# Patient Record
Sex: FEMALE | Race: WHITE
[De-identification: names, ages, dates, MRNs, and addresses within clinical notes are randomized per-mention and may not be internally consistent; named-entity substitution may affect disease eponyms.]

---

## 2017-08-01 NOTE — RADRPT
EXAM DATE/TIME:  08/01/2017 21:28 

 

[facility] COMPARISON:     

CT BRAIN W/O CONTRAST, November 22, 2015, 18:25.

 

 

INDICATIONS :     

Headaches with alter mental status.

                      

 

RADIATION DOSE:     

56.35 CTDIvol (mGy) 

 

 

 

MEDICAL HISTORY :     

Hypertension. Aneurysm, intracranial. Epilepsy

 

SURGICAL HISTORY :      

Craniotomy. 

 

ENCOUNTER:      

Initial

 

ACUITY:      

1 day

 

PAIN SCALE:      

5/10

 

LOCATION:        

cranial 

 

TECHNIQUE:     

Multiple contiguous axial images were obtained of the head.  Using automated exposure control and adj
ustment of the mA and/or kV according to patient size, radiation dose was kept as low as reasonably a
chievable to obtain optimal diagnostic quality images.   DICOM format image data is available electro
nically for review and comparison.  

 

FINDINGS:     

 

CEREBRUM:     

The ventricles are normal for age.  No evidence of midline shift, mass lesion, hemorrhage or acute in
farction.  Stable hypodensity in the left mid convexity frontal region adjacent to the anterior crani
otomy defect. No extra-axial fluid collections are seen.  The metallic artifact from aneurysm clip in
 the left supraclinoid region.

 

POSTERIOR FOSSA:     

The cerebellum and brainstem are intact.  The 4th ventricle is midline.  The cerebellopontine angle i
s unremarkable.

 

EXTRACRANIAL:     

The visualized portion of the orbits is intact.

 

SKULL:     

Good approximation of the left craniotomy flap, stable in configuration from prior.

 

CONCLUSION:     

No acute findings.

 

 

 

 Moshe Benjamin MD on August 01, 2017 at 22:02           

Board Certified Radiologist.

 This report was verified electronically.

## 2017-08-01 NOTE — PD
HPI


Chief Complaint:  Fall


Time Seen by Provider:  20:39


Travel History


International Travel<30 days:  No


Contact w/Intl Traveler<30days:  No





History of Present Illness


HPI


Patient is a 66-year-old female presenting to the emergency department for 

evaluation after being found on the floor by her family.  Patient is reporting 

a headache and pain in the back of her head.  She denies any other complaints 

however she reportedly has the mentation of a 14-year-old per EMS.  Patient 

does not remember if she fell or how she got on the floor.  She denies any neck 

pain, back pain, abdominal pain, chest pain.  She states that "she doesn't feel 

good".





PFSH


Past Medical History


Bipolar Disorder:  Yes


Cancer:  No


Diabetes:  Yes


Diminished Hearing:  No


Gastrointestinal Disorders:  No


Genitourinary:  No


Headaches:  No


Hypertension:  Yes


Musculoskeletal:  No


Neurologic:  Yes


Psychiatric:  Yes (BiPolar Disorder)


Reproductive:  No


Respiratory:  No


Immunizations Current:  Yes (unknown)


Migraines:  Yes


Seizures:  Yes





Past Surgical History


Tonsillectomy:  Yes


Other Surgery:  Yes (SPINAL SURGERY 12 YEARS AGO)





Social History


Alcohol Use:  No


Tobacco Use:  No


Substance Use:  No





Allergies-Medications


(Allergen,Severity, Reaction):  


Coded Allergies:  


     Aspirin (Verified  Allergy, Unknown, 5/16/16)


     Nonsteroidal Anti-Inflammatory Agts (Verified  Allergy, Unknown, Rash, 5/16 /16)


     MRI PRECAUTION (Verified  Adverse Reaction, Severe, ANEURYSM CLIPS IN BRAIN

, 5/16/16)


 11/22/15 JLA


Reported Meds & Prescriptions





Reported Meds & Active Scripts


Active


Prinivil 20 mg (Lisinopril) 20 Mg Tab 20 Mg PO Q12HR 


Novolog Insulin Supplemental Scale (Insulin Aspart) 100 /Ml  Inj 1 Units SQ 

ACHS SLIDING SCALE  30 Days


Levemir (Insulin Detemir) 100 Units/Ml Inj 10 Units SQ HS  30 Days


Hydrocodone/Acetaminophen 5 mg/325 mg 1 Tab Tab 1 Tab PO Q6H PRN


 Lipitor 20 Mg Tab (Atorvastatin) 20 Mg Tab 20 Mg PO HS 


Reported


Quetiapine Fumarate 25 Mg Tab 50 Mg PO HS 


Catapres 0.1 mg (Clonidine HCl) 0.1 Mg Tab 1 Tab PO BID 


Keppra (Levetiracetam) 250 Mg Tab 500 Mg PO BID 


Lithium Carbonate Er (Lithium Carbonate) 300 Mg Tab 300 Mg PO DAILY 








Review of Systems


ROS Limitations:  Poor Historian


Except as stated in HPI:  all other systems reviewed are Neg


HENT:  Positive: Headaches





Physical Exam


Narrative


GENERAL: Well-developed, well-nourished, alert  female.  Resting 

comfortably in no acute distress.


SKIN: Warm and dry.


HEAD: Atraumatic. Normocephalic.  No obvious lesions or abrasions, there is 

tenderness to palpation over the occipital area


EYES: Pupils equal and round. No scleral icterus. No injection or drainage. 


ENT: No nasal bleeding or discharge.  Mucous membranes pink and moist.


NECK: Trachea midline. No JVD. 


CARDIOVASCULAR: Regular rate and rhythm.  


RESPIRATORY: No accessory muscle use. Clear to auscultation. Breath sounds 

equal bilaterally. 


GASTROINTESTINAL: Abdomen soft, non-tender, nondistended. Hepatic and splenic 

margins not palpable. 


MUSCULOSKELETAL: Extremities without clubbing, cyanosis, or edema. No obvious 

deformities. 


NEUROLOGICAL: Awake and alert. No obvious cranial nerve deficits.  Motor 

grossly within normal limits. Five out of 5 muscle strength in the arms and 

legs.  Normal speech.


PSYCHIATRIC: Appropriate mood and affect; insight and judgment impaired.





Data


Data


Last Documented VS





Vital Signs








  Date Time  Temp Pulse Resp B/P Pulse Ox O2 Delivery O2 Flow Rate FiO2


 


8/1/17 21:02  65 20 164/80 98 Room Air  


 


8/1/17 20:31 98.0       








Orders





 Electrocardiogram (8/1/17 20:36)


Complete Blood Count With Diff (8/1/17 20:36)


Comprehensive Metabolic Panel (8/1/17 20:36)


Prothrombin Time / Inr (Pt) (8/1/17 20:36)


Act Partial Throm Time (Ptt) (8/1/17 20:36)


Urinalysis - C+S If Indicated (8/1/17 20:36)


Ct Brain W/O Iv Contrast(Rout) (8/1/17 20:36)


Blood Glucose (8/1/17 20:36)


Ecg Monitoring (8/1/17 20:36)


Iv Access Insert/Monitor (8/1/17 20:36)


Cath For Specimen (8/1/17 20:36)


Oximetry (8/1/17 20:36)


Sodium Chloride 0.9% Flush (Ns Flush) (8/1/17 20:45)


Acetaminophen (Tylenol) (8/1/17 22:15)


Sodium Chlor 0.9% 1000 Ml Inj (Ns 1000 M (8/1/17 22:30)





Labs





 Laboratory Tests








Test 8/1/17 8/1/17





 20:45 20:50


 


White Blood Count 9.2 TH/MM3 


 


Red Blood Count 3.86 MIL/MM3 


 


Hemoglobin 11.9 GM/DL 


 


Hematocrit 34.4 % 


 


Mean Corpuscular Volume 89.3 FL 


 


Mean Corpuscular Hemoglobin 30.9 PG 


 


Mean Corpuscular Hemoglobin 34.6 % 





Concent  


 


Red Cell Distribution Width 13.1 % 


 


Platelet Count 278 TH/MM3 


 


Mean Platelet Volume 9.3 FL 


 


Neutrophils (%) (Auto) 43.3 % 


 


Lymphocytes (%) (Auto) 45.2 % 


 


Monocytes (%) (Auto) 6.1 % 


 


Eosinophils (%) (Auto) 4.0 % 


 


Basophils (%) (Auto) 1.4 % 


 


Neutrophils # (Auto) 4.0 TH/MM3 


 


Lymphocytes # (Auto) 4.2 TH/MM3 


 


Monocytes # (Auto) 0.6 TH/MM3 


 


Eosinophils # (Auto) 0.4 TH/MM3 


 


Basophils # (Auto) 0.1 TH/MM3 


 


CBC Comment DIFF FINAL  


 


Differential Comment   


 


Prothrombin Time 10.3 SEC 


 


Prothromb Time International 0.9 RATIO 





Ratio  


 


Activated Partial 27.5 SEC 





Thromboplast Time  


 


Sodium Level 138 MEQ/L 


 


Potassium Level 4.0 MEQ/L 


 


Chloride Level 104 MEQ/L 


 


Carbon Dioxide Level 24.2 MEQ/L 


 


Anion Gap 10 MEQ/L 


 


Blood Urea Nitrogen 29 MG/DL 


 


Creatinine 1.16 MG/DL 


 


Estimat Glomerular Filtration 47 ML/MIN 





Rate  


 


Random Glucose 117 MG/DL 


 


Calcium Level 9.3 MG/DL 


 


Total Bilirubin 0.5 MG/DL 


 


Aspartate Amino Transf 23 U/L 





(AST/SGOT)  


 


Alanine Aminotransferase 18 U/L 





(ALT/SGPT)  


 


Alkaline Phosphatase 106 U/L 


 


Total Protein 8.8 GM/DL 


 


Albumin 4.0 GM/DL 


 


Urine Color  YELLOW 


 


Urine Turbidity  CLEAR 


 


Urine pH  5.5 


 


Urine Specific Gravity  1.020 


 


Urine Protein  TRACE mg/dL


 


Urine Glucose (UA)  NEG mg/dL


 


Urine Ketones  10 mg/dL


 


Urine Occult Blood  NEG 


 


Urine Nitrite  NEG 


 


Urine Bilirubin  NEG 


 


Urine Urobilinogen  LESS THAN 2.0





  MG/DL


 


Urine Leukocyte Esterase  NEG 


 


Urine RBC  2 /hpf


 


Urine WBC  2 /hpf


 


Microscopic Urinalysis Comment  CATH-CULT NOT





  IND











MDM


Medical Decision Making


Medical Screen Exam Complete:  Yes


Emergency Medical Condition:  Yes


Medical Record Reviewed:  Yes


Interpretation(s)





Last Impressions








Head CT 8/1/17 2036 Signed





Impressions: 





 Service Date/Time:  Tuesday, August 1, 2017 21:28 - CONCLUSION:  No acute 





 findings.     Moshe Benjamin MD 








 Laboratory Tests








Test 8/1/17 8/1/17





 20:45 20:50


 


White Blood Count 9.2 TH/MM3 


 


Red Blood Count 3.86 MIL/MM3 


 


Hemoglobin 11.9 GM/DL 


 


Hematocrit 34.4 % 


 


Mean Corpuscular Volume 89.3 FL 


 


Mean Corpuscular Hemoglobin 30.9 PG 


 


Mean Corpuscular Hemoglobin 34.6 % 





Concent  


 


Red Cell Distribution Width 13.1 % 


 


Platelet Count 278 TH/MM3 


 


Mean Platelet Volume 9.3 FL 


 


Neutrophils (%) (Auto) 43.3 % 


 


Lymphocytes (%) (Auto) 45.2 % 


 


Monocytes (%) (Auto) 6.1 % 


 


Eosinophils (%) (Auto) 4.0 % 


 


Basophils (%) (Auto) 1.4 % 


 


Neutrophils # (Auto) 4.0 TH/MM3 


 


Lymphocytes # (Auto) 4.2 TH/MM3 


 


Monocytes # (Auto) 0.6 TH/MM3 


 


Eosinophils # (Auto) 0.4 TH/MM3 


 


Basophils # (Auto) 0.1 TH/MM3 


 


CBC Comment DIFF FINAL  


 


Differential Comment   


 


Prothrombin Time 10.3 SEC 


 


Prothromb Time International 0.9 RATIO 





Ratio  


 


Activated Partial 27.5 SEC 





Thromboplast Time  


 


Sodium Level 138 MEQ/L 


 


Potassium Level 4.0 MEQ/L 


 


Chloride Level 104 MEQ/L 


 


Carbon Dioxide Level 24.2 MEQ/L 


 


Anion Gap 10 MEQ/L 


 


Blood Urea Nitrogen 29 MG/DL 


 


Creatinine 1.16 MG/DL 


 


Estimat Glomerular Filtration 47 ML/MIN 





Rate  


 


Random Glucose 117 MG/DL 


 


Calcium Level 9.3 MG/DL 


 


Total Bilirubin 0.5 MG/DL 


 


Aspartate Amino Transf 23 U/L 





(AST/SGOT)  


 


Alanine Aminotransferase 18 U/L 





(ALT/SGPT)  


 


Alkaline Phosphatase 106 U/L 


 


Total Protein 8.8 GM/DL 


 


Albumin 4.0 GM/DL 


 


Urine Color  YELLOW 


 


Urine Turbidity  CLEAR 


 


Urine pH  5.5 


 


Urine Specific Gravity  1.020 


 


Urine Protein  TRACE mg/dL


 


Urine Glucose (UA)  NEG mg/dL


 


Urine Ketones  10 mg/dL


 


Urine Occult Blood  NEG 


 


Urine Nitrite  NEG 


 


Urine Bilirubin  NEG 


 


Urine Urobilinogen  LESS THAN 2.0





  MG/DL


 


Urine Leukocyte Esterase  NEG 


 


Urine RBC  2 /hpf


 


Urine WBC  2 /hpf


 


Microscopic Urinalysis Comment  CATH-CULT NOT





  IND








Vital Signs








  Date Time  Temp Pulse Resp B/P Pulse Ox O2 Delivery O2 Flow Rate FiO2


 


8/1/17 21:02  65 20 164/80 98 Room Air  


 


8/1/17 20:52     98 Room Air  


 


8/1/17 20:45  75 18 222/89 98 Room Air  


 


8/1/17 20:31 98.0 68 15 198/90 100   








Differential Diagnosis


UTI versus contusion versus hemorrhage versus metabolic abnormality versus other


Narrative Course


Patient is a 66-year-old female presenting to the emergency department for 

evaluation after being found on the floor likely unwitnessed fall.  She is 

complaining of a headache and head pain.  She is alert, she appears to be at 

her baseline mentation.  No meningeal signs. Labs and imaging ordered and 

pending.


Initial EKG shows sinus rhythm with a rate of 60


CT of the brain shows no acute abnormality


CMP- BUN and creatinine 29/1.16


Coags are unremarkable


Urinalysis is unremarkable


Acetaminophen ordered for headache, 1 L IV fluids ordered.


Pt will be discharged home. She is to follow up with PCP. Pt to return to the 

ED for any new or worsening symptoms. Discussed with RN.





Diagnosis





 Primary Impression:  


 Fall


 Qualified Code:  W19.XXXA - Fall, initial encounter


 Additional Impression:  


 Headache


 Qualified Code:  R51 - Nonintractable headache, unspecified chronicity pattern

, unspecified headache type


Referrals:  


Primary Care Physician


1 day


Patient Instructions:  Acute Headache (ED), Fall Prevention (ED), General 

Instructions





***Additional Instructions:


Increase fluid intake


Follow up with primary doctor


Return to the Emergency Department for any new or worsening symptoms


***Med/Other Pt SpecificInfo:  No Change to Meds


Disposition:  01 DISCHARGE HOME


Condition:  Stable








Meagan Saldaña Aug 1, 2017 20:52

## 2017-08-02 NOTE — EKG
Date Performed: 08/01/2017       Time Performed: 21:18:17

 

PTAGE:      66 years

 

EKG:      Sinus rhythm 

 

 NORMAL ECG

 

PREVIOUS TRACING       : 11/22/2015 18.02

 

DOCTOR:   Giorgio Garber  Interpretating Date/Time  08/02/2017 09:14:05

## 2017-08-12 NOTE — PD
HPI


Chief Complaint:  Syncope/Near-Syncope


Time Seen by Provider:  11:17


Travel History


International Travel<30 days:  No


Contact w/Intl Traveler<30days:  No


Traveled to known affect area:  No





History of Present Illness


HPI


The patient was seen and examined in the presence of the nurse.  This patient 

was taking her pet to get vaccinated and became dizzy and lightheaded.  She 

lost her balance and fell.  No syncope.  She denies losing consciousness or 

having had or neck injury or pain.  At this point she feels like some general 

malaise.  No chest pain.  Symptoms severity is moderate.  No alleviating 

factors.  Duration 10 minutes





PFSH


Past Medical History


Bipolar Disorder:  Yes


Cancer:  No


Cardiovascular Problems:  Yes


Diabetes:  Yes


Diminished Hearing:  No


Gastrointestinal Disorders:  No


Genitourinary:  No


Headaches:  Yes


Hypertension:  Yes


Musculoskeletal:  No


Neurologic:  Yes


Psychiatric:  Yes (BiPolar Disorder)


Reproductive:  No


Respiratory:  No


Immunizations Current:  Yes (unknown)


Migraines:  Yes


Seizures:  Yes





Past Surgical History


Tonsillectomy:  Yes


Other Surgery:  Yes (SPINAL SURGERY 12 YEARS AGO)





Social History


Alcohol Use:  No


Tobacco Use:  No


Substance Use:  No





Allergies-Medications


(Allergen,Severity, Reaction):  


Coded Allergies:  


     Aspirin (Verified  Allergy, Unknown, 5/16/16)


     Nonsteroidal Anti-Inflammatory Agts (Verified  Allergy, Unknown, Rash, 5/16 /16)


     MRI PRECAUTION (Verified  Adverse Reaction, Severe, ANEURYSM CLIPS IN BRAIN

, 5/16/16)


 11/22/15 JLA


Reported Meds & Prescriptions





Reported Meds & Active Scripts


Active


Prinivil 20 mg (Lisinopril) 20 Mg Tab 20 Mg PO Q12HR 


Novolog Insulin Supplemental Scale (Insulin Aspart) 100 /Ml  Inj 1 Units SQ 

ACHS SLIDING SCALE  30 Days


Levemir (Insulin Detemir) 100 Units/Ml Inj 10 Units SQ HS  30 Days


Hydrocodone/Acetaminophen 5 mg/325 mg 1 Tab Tab 1 Tab PO Q6H PRN


 Lipitor 20 Mg Tab (Atorvastatin) 20 Mg Tab 20 Mg PO HS 


Reported


Quetiapine Fumarate 25 Mg Tab 50 Mg PO HS 


Catapres 0.1 mg (Clonidine HCl) 0.1 Mg Tab 1 Tab PO BID 


Keppra (Levetiracetam) 250 Mg Tab 500 Mg PO BID 


Lithium Carbonate Er (Lithium Carbonate) 300 Mg Tab 300 Mg PO DAILY 








Review of Systems


General / Constitutional:  No: Fever


Eyes:  No: Visual changes


HENT:  Positive: Lightheadedness,  No: Headaches


Cardiovascular:  No: Chest Pain or Discomfort


Respiratory:  No: Shortness of Breath


Gastrointestinal:  No: Abdominal Pain


Genitourinary:  No: Dysuria


Musculoskeletal:  Positive: Weakness,  No: Pain


Skin:  No Rash


Neurologic:  Positive: Weakness, Dizziness


Psychiatric:  No: Depression


Endocrine:  No: Polydipsia


Hematologic/Lymphatic:  No: Easy Bruising





Physical Exam


Narrative


GENERAL: Well-nourished, well-developed patient in no apparent distress.


SKIN: Focused skin assessment reveals no rash and nodules. Skin is Warm and dry.


HEAD: Atraumatic. Normocephalic. 


EYES: Pupils equal and round. No scleral icterus. No injection or drainage. 


ENT: No nasal bleeding or discharge.  Mucous membranes pink and moist.


NECK: Trachea midline. No JVD. 


CARDIOVASCULAR: Regular rate and rhythm.  No murmur appreciated.


RESPIRATORY: No accessory muscle use. Clear to auscultation. Breath sounds 

equal bilaterally. 


GASTROINTESTINAL: Abdomen soft, non-tender, nondistended. Hepatic and splenic 

margins not palpable. 


MUSCULOSKELETAL: No obvious deformities. No clubbing.  No cyanosis.  No edema. 


NEUROLOGICAL: Awake and alert. No obvious cranial nerve deficits.  Motor 

grossly within normal limits. Normal speech.


PSYCHIATRIC: Appropriate mood and affect; insight and judgment normal.





Data


Data


Last Documented VS








Vital Signs








  Date Time  Temp Pulse Resp B/P Pulse Ox O2 Delivery O2 Flow Rate FiO2


 


8/12/17 11:34   16  98 Room Air  


 


8/12/17 11:22 98.2 65  121/65    











Orders








 Electrocardiogram (8/12/17 )


Iv Access Insert/Monitor (8/12/17 11:28)


Complete Blood Count With Diff (8/12/17 11:28)


Basic Metabolic Panel (Bmp) (8/12/17 11:28)








Labs








 Laboratory Tests








Test 8/12/17





 11:30


 


White Blood Count 7.2 TH/MM3


 


Red Blood Count 3.59 MIL/MM3


 


Hemoglobin 11.0 GM/DL


 


Hematocrit 32.9 %


 


Mean Corpuscular Volume 91.6 FL


 


Mean Corpuscular Hemoglobin 30.7 PG


 


Mean Corpuscular Hemoglobin 33.5 %





Concent 


 


Red Cell Distribution Width 13.2 %


 


Platelet Count 227 TH/MM3


 


Mean Platelet Volume 9.2 FL


 


Neutrophils (%) (Auto) 53.5 %


 


Lymphocytes (%) (Auto) 34.7 %


 


Monocytes (%) (Auto) 8.2 %


 


Eosinophils (%) (Auto) 2.5 %


 


Basophils (%) (Auto) 1.1 %


 


Neutrophils # (Auto) 3.9 TH/MM3


 


Lymphocytes # (Auto) 2.5 TH/MM3


 


Monocytes # (Auto) 0.6 TH/MM3


 


Eosinophils # (Auto) 0.2 TH/MM3


 


Basophils # (Auto) 0.1 TH/MM3


 


CBC Comment DIFF FINAL 


 


Differential Comment  


 


Sodium Level 143 MEQ/L


 


Potassium Level 3.9 MEQ/L


 


Chloride Level 108 MEQ/L


 


Carbon Dioxide Level 24.6 MEQ/L


 


Anion Gap 10 MEQ/L


 


Blood Urea Nitrogen 25 MG/DL


 


Creatinine 1.32 MG/DL


 


Estimat Glomerular Filtration 41 ML/MIN





Rate 


 


Random Glucose 138 MG/DL


 


Calcium Level 8.7 MG/DL














[facility]


Medical Decision Making


Medical Screen Exam Complete:  Yes


Emergency Medical Condition:  Yes


Medical Record Reviewed:  Yes


Differential Diagnosis


Cardiac arrhythmia, vasovagal episode, electrolyte abnormality


Narrative Course


I have reviewed the patient's electronic medical record.  Patient was seen here 

11 days ago with fall had extensive workup which was negative





IV placed


CBC is normal


Metabolic profile shows minimal renal insufficiency 


Extended cardiac monitoring reveals sinus rhythm without ectopy


I reviewed her EKG which shows sinus rhythm without ectopy or ST elevation





On reassessment she is feeling well


No indication for emergent imaging


Vital signs are normal


Patient does have a walker but she declines to use it.  She did not have it 

when she fell.  I encouraged her to use it until she can discuss with her 

physician at least





Diagnosis





 Primary Impression:  


 Fall


 Qualified Code:  W19.XXXA - Fall, initial encounter


 Additional Impressions:  


 Ataxia


 Generalized weakness





***Additional Instructions:


Use walker


The patient was advised to follow up with their physician and return if they 

worsen.


***Med/Other Pt SpecificInfo:  Other


Disposition:  01 DISCHARGE HOME


Condition:  Stable








Cas Patricio MD Aug 12, 2017 11:31

## 2017-08-13 NOTE — EKG
Date Performed: 08/12/2017       Time Performed: 11:11:44

 

PTAGE:      66 years

 

EKG:      Sinus rhythm 

 

 NONSPECIFIC ST & T-WAVE ABNORMALITY BORDERLINE ECG

 

PREVIOUS TRACING       : 08/01/2017 21.18 Compared to prior tracing no significant change

 

DOCTOR:   Andrew Noonan  Interpretating Date/Time  08/13/2017 12:23:52

## 2018-02-10 ENCOUNTER — HOSPITAL ENCOUNTER (EMERGENCY)
Dept: HOSPITAL 17 - NEPE | Age: 68
Discharge: HOME | End: 2018-02-10
Payer: COMMERCIAL

## 2018-02-10 VITALS
DIASTOLIC BLOOD PRESSURE: 62 MMHG | HEART RATE: 64 BPM | SYSTOLIC BLOOD PRESSURE: 133 MMHG | OXYGEN SATURATION: 98 % | RESPIRATION RATE: 20 BRPM

## 2018-02-10 VITALS
HEART RATE: 68 BPM | OXYGEN SATURATION: 100 % | RESPIRATION RATE: 21 BRPM | DIASTOLIC BLOOD PRESSURE: 75 MMHG | SYSTOLIC BLOOD PRESSURE: 157 MMHG

## 2018-02-10 VITALS
DIASTOLIC BLOOD PRESSURE: 53 MMHG | OXYGEN SATURATION: 98 % | HEART RATE: 64 BPM | RESPIRATION RATE: 20 BRPM | SYSTOLIC BLOOD PRESSURE: 106 MMHG

## 2018-02-10 VITALS — OXYGEN SATURATION: 98 %

## 2018-02-10 VITALS
HEART RATE: 62 BPM | RESPIRATION RATE: 18 BRPM | SYSTOLIC BLOOD PRESSURE: 165 MMHG | OXYGEN SATURATION: 99 % | DIASTOLIC BLOOD PRESSURE: 74 MMHG

## 2018-02-10 VITALS
SYSTOLIC BLOOD PRESSURE: 150 MMHG | RESPIRATION RATE: 17 BRPM | OXYGEN SATURATION: 100 % | DIASTOLIC BLOOD PRESSURE: 68 MMHG | HEART RATE: 59 BPM

## 2018-02-10 VITALS — DIASTOLIC BLOOD PRESSURE: 53 MMHG | SYSTOLIC BLOOD PRESSURE: 108 MMHG | RESPIRATION RATE: 18 BRPM

## 2018-02-10 VITALS
OXYGEN SATURATION: 98 % | TEMPERATURE: 98 F | HEART RATE: 67 BPM | DIASTOLIC BLOOD PRESSURE: 82 MMHG | RESPIRATION RATE: 22 BRPM | SYSTOLIC BLOOD PRESSURE: 115 MMHG

## 2018-02-10 VITALS — BODY MASS INDEX: 26.17 KG/M2 | HEIGHT: 62 IN | WEIGHT: 142.2 LBS

## 2018-02-10 VITALS
SYSTOLIC BLOOD PRESSURE: 115 MMHG | HEART RATE: 67 BPM | OXYGEN SATURATION: 98 % | DIASTOLIC BLOOD PRESSURE: 82 MMHG | TEMPERATURE: 98 F | RESPIRATION RATE: 22 BRPM

## 2018-02-10 DIAGNOSIS — I10: ICD-10-CM

## 2018-02-10 DIAGNOSIS — R42: Primary | ICD-10-CM

## 2018-02-10 DIAGNOSIS — G40.909: ICD-10-CM

## 2018-02-10 DIAGNOSIS — N30.00: ICD-10-CM

## 2018-02-10 DIAGNOSIS — Z79.84: ICD-10-CM

## 2018-02-10 DIAGNOSIS — E11.9: ICD-10-CM

## 2018-02-10 DIAGNOSIS — R00.1: ICD-10-CM

## 2018-02-10 DIAGNOSIS — F31.9: ICD-10-CM

## 2018-02-10 DIAGNOSIS — R53.1: ICD-10-CM

## 2018-02-10 LAB
ALBUMIN SERPL-MCNC: 3.4 GM/DL (ref 3.4–5)
ALP SERPL-CCNC: 109 U/L (ref 45–117)
ALT SERPL-CCNC: 13 U/L (ref 10–53)
AST SERPL-CCNC: 19 U/L (ref 15–37)
BACTERIA #/AREA URNS HPF: (no result) /HPF
BASOPHILS # BLD AUTO: 0.1 TH/MM3 (ref 0–0.2)
BASOPHILS NFR BLD: 1.6 % (ref 0–2)
BILIRUB SERPL-MCNC: 0.5 MG/DL (ref 0.2–1)
BUN SERPL-MCNC: 26 MG/DL (ref 7–18)
CALCIUM SERPL-MCNC: 8.2 MG/DL (ref 8.5–10.1)
CHLORIDE SERPL-SCNC: 106 MEQ/L (ref 98–107)
COLOR UR: YELLOW
CREAT SERPL-MCNC: 1.24 MG/DL (ref 0.5–1)
EOSINOPHIL # BLD: 0.2 TH/MM3 (ref 0–0.4)
EOSINOPHIL NFR BLD: 3.5 % (ref 0–4)
ERYTHROCYTE [DISTWIDTH] IN BLOOD BY AUTOMATED COUNT: 12.5 % (ref 11.6–17.2)
GFR SERPLBLD BASED ON 1.73 SQ M-ARVRAT: 43 ML/MIN (ref 89–?)
GLUCOSE SERPL-MCNC: 260 MG/DL (ref 74–106)
GLUCOSE UR STRIP-MCNC: 1000 MG/DL
HCO3 BLD-SCNC: 25.7 MEQ/L (ref 21–32)
HCT VFR BLD CALC: 33.4 % (ref 35–46)
HGB BLD-MCNC: 11.7 GM/DL (ref 11.6–15.3)
HGB UR QL STRIP: (no result)
INR PPP: 1.1 RATIO
KETONES UR STRIP-MCNC: (no result) MG/DL
LYMPHOCYTES # BLD AUTO: 2.5 TH/MM3 (ref 1–4.8)
LYMPHOCYTES NFR BLD AUTO: 35.3 % (ref 9–44)
MCH RBC QN AUTO: 30.9 PG (ref 27–34)
MCHC RBC AUTO-ENTMCNC: 34.9 % (ref 32–36)
MCV RBC AUTO: 88.6 FL (ref 80–100)
MONOCYTE #: 0.5 TH/MM3 (ref 0–0.9)
MONOCYTES NFR BLD: 6.5 % (ref 0–8)
MUCOUS THREADS #/AREA URNS LPF: (no result) /LPF
NEUTROPHILS # BLD AUTO: 3.8 TH/MM3 (ref 1.8–7.7)
NEUTROPHILS NFR BLD AUTO: 53.1 % (ref 16–70)
NITRITE UR QL STRIP: (no result)
PLATELET # BLD: 206 TH/MM3 (ref 150–450)
PMV BLD AUTO: 9 FL (ref 7–11)
PROT SERPL-MCNC: 7.6 GM/DL (ref 6.4–8.2)
PROTHROMBIN TIME: 11.1 SEC (ref 9.8–11.6)
RBC # BLD AUTO: 3.78 MIL/MM3 (ref 4–5.3)
SODIUM SERPL-SCNC: 139 MEQ/L (ref 136–145)
SP GR UR STRIP: 1.02 (ref 1–1.03)
SQUAMOUS #/AREA URNS HPF: 1 /HPF (ref 0–5)
TROPONIN I SERPL-MCNC: (no result) NG/ML (ref 0.02–0.05)
URINE LEUKOCYTE ESTERASE: (no result)
WBC # BLD AUTO: 7.1 TH/MM3 (ref 4–11)

## 2018-02-10 PROCEDURE — 80053 COMPREHEN METABOLIC PANEL: CPT

## 2018-02-10 PROCEDURE — 82550 ASSAY OF CK (CPK): CPT

## 2018-02-10 PROCEDURE — 96365 THER/PROPH/DIAG IV INF INIT: CPT

## 2018-02-10 PROCEDURE — 84484 ASSAY OF TROPONIN QUANT: CPT

## 2018-02-10 PROCEDURE — 80307 DRUG TEST PRSMV CHEM ANLYZR: CPT

## 2018-02-10 PROCEDURE — 80178 ASSAY OF LITHIUM: CPT

## 2018-02-10 PROCEDURE — 85730 THROMBOPLASTIN TIME PARTIAL: CPT

## 2018-02-10 PROCEDURE — 96361 HYDRATE IV INFUSION ADD-ON: CPT

## 2018-02-10 PROCEDURE — 85025 COMPLETE CBC W/AUTO DIFF WBC: CPT

## 2018-02-10 PROCEDURE — 84443 ASSAY THYROID STIM HORMONE: CPT

## 2018-02-10 PROCEDURE — 85610 PROTHROMBIN TIME: CPT

## 2018-02-10 PROCEDURE — 99285 EMERGENCY DEPT VISIT HI MDM: CPT

## 2018-02-10 PROCEDURE — 70450 CT HEAD/BRAIN W/O DYE: CPT

## 2018-02-10 PROCEDURE — 81001 URINALYSIS AUTO W/SCOPE: CPT

## 2018-02-10 PROCEDURE — 82140 ASSAY OF AMMONIA: CPT

## 2018-02-10 PROCEDURE — 87086 URINE CULTURE/COLONY COUNT: CPT

## 2018-02-10 PROCEDURE — 93005 ELECTROCARDIOGRAM TRACING: CPT

## 2018-02-10 RX ADMIN — Medication PRN ML: at 09:12

## 2018-02-10 RX ADMIN — Medication PRN ML: at 08:07

## 2018-02-10 NOTE — PD
HPI


Chief Complaint:  Altered Mental Status


Time Seen by Provider:  07:49


Travel History


International Travel<30 days:  No


Contact w/Intl Traveler<30days:  No


Traveled to known affect area:  No





History of Present Illness


HPI


The patient is a 67-year-old  female who presents to the emergency 

department via EMS for altered mental status.  According to EMS the patient 

walks to [facility] every day, approximately one mile from her house, and when 

she walked to [facility] earlier today she appeared to be altered.  According 

to EMS the patient was noted to be acting differently, pale, diaphoretic, and 

hard to arouse.  When EMS arrived the patient was awake, somewhat lethargic, 

but no obvious postictal state.  The patient's blood sugar was noted to be in 

the 200s.  Upon arrival the patient does complain of feeling weak, is unsure if 

she had any loss of consciousness while at [facility].  She does complain of 

generalized weakness and malaise.  She does have a history of seizure disorder, 

does not know her medication.  She does state they recently increased her dose 

of seizure medication 2 months ago.  She complains of chronic right sided 

weakness and numbness, states is secondary to her previous seizures.  She 

denies any acute change in the weakness on the right side.  She does state she 

was dizzy when ambulating and felt like she was walking "drawn".  She denies 

any chest pain, shortness breath, nausea, vomiting, or abdominal pain.  

Symptoms are moderate.





PFSH


Past Medical History


Bipolar Disorder:  Yes


Cancer:  No


Cardiovascular Problems:  Yes


Diabetes:  Yes


Diminished Hearing:  No


Gastrointestinal Disorders:  No


Genitourinary:  No


Headaches:  Yes


Hypertension:  Yes


Musculoskeletal:  No


Neurologic:  Yes


Psychiatric:  Yes


Reproductive:  No


Respiratory:  No


Immunizations Current:  Yes (unknown)


Migraines:  Yes


Seizures:  Yes





Past Surgical History


Neurologic Surgery:  Yes (BACK SURGERY)


Tonsillectomy:  Yes


Other Surgery:  Yes (SPINAL SURGERY 12 YEARS AGO)





Social History


Alcohol Use:  No


Tobacco Use:  No


Substance Use:  No





Allergies-Medications


(Allergen,Severity, Reaction):  


Coded Allergies:  


     aspirin (Unverified  Allergy, Unknown, 2/10/18)


     diclofenac (Unverified  Allergy, Unknown, Rash, 2/10/18)


     etodolac (Unverified  Allergy, Unknown, Rash, 2/10/18)


     flurbiprofen (Unverified  Allergy, Unknown, Rash, 2/10/18)


     ibuprofen (Unverified  Allergy, Unknown, Rash, 2/10/18)


     indomethacin (Unverified  Allergy, Unknown, Rash, 2/10/18)


     ketoprofen (Unverified  Allergy, Unknown, Rash, 2/10/18)


     ketorolac (Unverified  Allergy, Unknown, Rash, 2/10/18)


     naproxen (Unverified  Allergy, Unknown, Rash, 2/10/18)


     oxaprozin (Unverified  Allergy, Unknown, Rash, 2/10/18)


     MRI PRECAUTION (Verified  Adverse Reaction, Severe, ANEURYSM CLIPS IN BRAIN

, 2/10/18)


 11/22/15 JLA


Reported Meds & Prescriptions





Reported Meds & Active Scripts


Active


Reported


Seroquel (Quetiapine Fumarate) 50 Mg Tab 50 Mg PO AS DIRECTED


Lithium Carbonate 300 Mg Cap 300 Mg PO DAILY


Hydrocodone-Acetaminophen 5-325 mg Tab 1 Tab PO Q6H PRN


Lipitor (Atorvastatin Calcium) 20 Mg Tab 20 Mg PO HS


[Diabetes Medication]   Mg PO DAILY


Lisinopril 20 Mg Tab 20 Mg PO Q12HR


Keppra (Levetiracetam) 500 Mg Tab 500 Mg PO BID


Clonidine (Clonidine HCl) 0.1 Mg Tab 0.1 Mg PO BID








Review of Systems


Except as stated in HPI:  all other systems reviewed are Neg


General / Constitutional:  No: Fever


Eyes:  No: Blurred Vision


HENT:  Positive: Lightheadedness


Cardiovascular:  No: Chest Pain or Discomfort


Respiratory:  No: Shortness of Breath


Gastrointestinal:  No: Nausea, Vomiting, Diarrhea, Abdominal Pain


Genitourinary:  No: Dysuria


Musculoskeletal:  Positive: Weakness


Neurologic:  Positive: Dizziness


Psychiatric:  Positive: Mood Disorder





Physical Exam


Narrative


GENERAL: Awake, alert, 67-year-old female appears her stated age and is in no 

acute respiratory distress.


SKIN: Focused skin assessment warm/dry.


HEAD: Atraumatic. Normocephalic. 


EYES: Pupils equal and round.  2 mm bilateral and reactive.  EOMs are intact.  

Difficulty seeing fingers at a distance of 2 feet.  Mild pallor of the lower 

conjunctiva.


ENT: No nasal bleeding or discharge.  Upper dentures in place.


NECK: Trachea midline. No JVD. 


CARDIOVASCULAR: Regular rate and rhythm.  No murmur appreciated.


RESPIRATORY: No accessory muscle use. Clear to auscultation. Breath sounds 

equal bilaterally. 


GASTROINTESTINAL: Abdomen soft, non-tender, nondistended.  No rebound 

tenderness.


MUSCULOSKELETAL: No obvious deformities. No clubbing.  No cyanosis.  No edema. 


NEUROLOGICAL: Awake and alert. No obvious cranial nerve deficits.  No obvious 

dysarthria.  Mild drift to the right arm and right leg, patient states that is 

chronic.  Mildly decreased sensation to the right side of face, right arm, and 

right leg, patient states that is chronic.  She is oriented to person, date of 

birth, and location.  Does not know the current month, year, and states the 

president of [place] is Gutierrez.


PSYCHIATRIC: Flat affect.





Data


Data


Last Documented VS





Vital Signs








  Date Time  Temp Pulse Resp B/P (MAP) Pulse Ox O2 Delivery O2 Flow Rate FiO2


 


2/10/18 09:17  59 17 150/68 (95) 100 Room Air  


 


2/10/18 07:49 98.0       








Orders





 Orders


Electrocardiogram (2/10/18 07:49)


Ammonia (2/10/18 07:49)


Complete Blood Count With Diff (2/10/18 07:49)


Comprehensive Metabolic Panel (2/10/18 07:49)


Creatine Kinase (Cpk) (2/10/18 07:49)


Prothrombin Time / Inr (Pt) (2/10/18 07:49)


Act Partial Throm Time (Ptt) (2/10/18 07:49)


Troponin I (2/10/18 07:49)


Thyroid Stimulating Hormone (2/10/18 07:49)


Urinalysis - C+S If Indicated (2/10/18 07:49)


Ct Brain W/O Iv Contrast(Rout) (2/10/18 07:49)


Blood Glucose (2/10/18 07:49)


Ecg Monitoring (2/10/18 07:49)


Iv Access Insert/Monitor (2/10/18 07:49)


Oximetry (2/10/18 07:49)


Sodium Chloride 0.9% Flush (Ns Flush) (2/10/18 08:00)


Sodium Chlor 0.9% 1000 Ml Inj (Ns 1000 M (2/10/18 07:49)


Drug Screen, Random Urine (2/10/18 07:49)


Alcohol (Ethanol) (2/10/18 07:49)


Lithium (Li) (2/10/18 07:49)


Orthostatic Vital Signs (2/10/18 07:49)


Sodium Chlorid 0.9% 500 Ml Inj (Ns 500 M (2/10/18 09:15)


Urine Culture (2/10/18 09:10)


Ciprofloxacin 400 Mg Premix (Cipro 400 M (2/10/18 10:00)


Ed Discharge Order (2/10/18 09:59)





Labs





Laboratory Tests








Test


  2/10/18


07:50 2/10/18


09:10


 


White Blood Count 7.1 TH/MM3  


 


Red Blood Count 3.78 MIL/MM3  


 


Hemoglobin 11.7 GM/DL  


 


Hematocrit 33.4 %  


 


Mean Corpuscular Volume 88.6 FL  


 


Mean Corpuscular Hemoglobin 30.9 PG  


 


Mean Corpuscular Hemoglobin


Concent 34.9 % 


  


 


 


Red Cell Distribution Width 12.5 %  


 


Platelet Count 206 TH/MM3  


 


Mean Platelet Volume 9.0 FL  


 


Neutrophils (%) (Auto) 53.1 %  


 


Lymphocytes (%) (Auto) 35.3 %  


 


Monocytes (%) (Auto) 6.5 %  


 


Eosinophils (%) (Auto) 3.5 %  


 


Basophils (%) (Auto) 1.6 %  


 


Neutrophils # (Auto) 3.8 TH/MM3  


 


Lymphocytes # (Auto) 2.5 TH/MM3  


 


Monocytes # (Auto) 0.5 TH/MM3  


 


Eosinophils # (Auto) 0.2 TH/MM3  


 


Basophils # (Auto) 0.1 TH/MM3  


 


CBC Comment DIFF FINAL  


 


Differential Comment   


 


Prothrombin Time 11.1 SEC  


 


Prothromb Time International


Ratio 1.1 RATIO 


  


 


 


Activated Partial


Thromboplast Time 24.3 SEC 


  


 


 


Blood Urea Nitrogen 26 MG/DL  


 


Creatinine 1.24 MG/DL  


 


Random Glucose 260 MG/DL  


 


Total Protein 7.6 GM/DL  


 


Albumin 3.4 GM/DL  


 


Calcium Level 8.2 MG/DL  


 


Alkaline Phosphatase 109 U/L  


 


Aspartate Amino Transf


(AST/SGOT) 19 U/L 


  


 


 


Alanine Aminotransferase


(ALT/SGPT) 13 U/L 


  


 


 


Total Bilirubin 0.5 MG/DL  


 


Sodium Level 139 MEQ/L  


 


Potassium Level 3.7 MEQ/L  


 


Chloride Level 106 MEQ/L  


 


Carbon Dioxide Level 25.7 MEQ/L  


 


Anion Gap 7 MEQ/L  


 


Estimat Glomerular Filtration


Rate 43 ML/MIN 


  


 


 


Ammonia 32 MCMOL/L  


 


Total Creatine Kinase 83 U/L  


 


Troponin I


  LESS THAN 0.02


NG/ML 


 


 


Thyroid Stimulating Hormone


3rd Gen 3.820 uIU/ML 


  


 


 


Lithium Level


  LESS THAN 0.1


MEQ/L 


 


 


Ethyl Alcohol Level


  LESS THAN 3


MG/DL 


 


 


Urine Color  YELLOW 


 


Urine Turbidity  CLEAR 


 


Urine pH  6.5 


 


Urine Specific Gravity  1.021 


 


Urine Protein  TRACE mg/dL 


 


Urine Glucose (UA)  1000 mg/dL 


 


Urine Ketones  NEG mg/dL 


 


Urine Occult Blood  NEG 


 


Urine Nitrite  NEG 


 


Urine Bilirubin  NEG 


 


Urine Urobilinogen


  


  LESS THAN 2.0


MG/DL


 


Urine Leukocyte Esterase  LARGE 


 


Urine RBC


  


  LESS THAN 1


/hpf


 


Urine WBC  17 /hpf 


 


Urine Squamous Epithelial


Cells 


  1 /hpf 


 


 


Urine Bacteria  OCC /hpf 


 


Urine Mucus  FEW /lpf 


 


Microscopic Urinalysis Comment


  


  CATH-CULTURE


IND











[facility]


Medical Decision Making


Medical Screen Exam Complete:  Yes


Emergency Medical Condition:  Yes


Medical Record Reviewed:  Yes


Interpretation(s)


EKG reveals sinus bradycardia with a heart rate of 58.  Voltage criteria for 

LVH.  Nonspecific T wave changes.








Last Impressions








Head CT 2/10/18 0749 Signed





Impressions: 





 Service Date/Time:  Saturday, February 10, 2018 08:29 - CONCLUSION:  No acute 





 disease.       Theresa Buckley MD 








Laboratory Tests








Test


  2/10/18


07:50 2/10/18


09:10


 


White Blood Count 7.1 TH/MM3  


 


Red Blood Count 3.78 MIL/MM3  


 


Hemoglobin 11.7 GM/DL  


 


Hematocrit 33.4 %  


 


Mean Corpuscular Volume 88.6 FL  


 


Mean Corpuscular Hemoglobin 30.9 PG  


 


Mean Corpuscular Hemoglobin


Concent 34.9 % 


  


 


 


Red Cell Distribution Width 12.5 %  


 


Platelet Count 206 TH/MM3  


 


Mean Platelet Volume 9.0 FL  


 


Neutrophils (%) (Auto) 53.1 %  


 


Lymphocytes (%) (Auto) 35.3 %  


 


Monocytes (%) (Auto) 6.5 %  


 


Eosinophils (%) (Auto) 3.5 %  


 


Basophils (%) (Auto) 1.6 %  


 


Neutrophils # (Auto) 3.8 TH/MM3  


 


Lymphocytes # (Auto) 2.5 TH/MM3  


 


Monocytes # (Auto) 0.5 TH/MM3  


 


Eosinophils # (Auto) 0.2 TH/MM3  


 


Basophils # (Auto) 0.1 TH/MM3  


 


CBC Comment DIFF FINAL  


 


Differential Comment   


 


Prothrombin Time 11.1 SEC  


 


Prothromb Time International


Ratio 1.1 RATIO 


  


 


 


Activated Partial


Thromboplast Time 24.3 SEC 


  


 


 


Blood Urea Nitrogen 26 MG/DL  


 


Creatinine 1.24 MG/DL  


 


Random Glucose 260 MG/DL  


 


Total Protein 7.6 GM/DL  


 


Albumin 3.4 GM/DL  


 


Calcium Level 8.2 MG/DL  


 


Alkaline Phosphatase 109 U/L  


 


Aspartate Amino Transf


(AST/SGOT) 19 U/L 


  


 


 


Alanine Aminotransferase


(ALT/SGPT) 13 U/L 


  


 


 


Total Bilirubin 0.5 MG/DL  


 


Sodium Level 139 MEQ/L  


 


Potassium Level 3.7 MEQ/L  


 


Chloride Level 106 MEQ/L  


 


Carbon Dioxide Level 25.7 MEQ/L  


 


Anion Gap 7 MEQ/L  


 


Estimat Glomerular Filtration


Rate 43 ML/MIN 


  


 


 


Ammonia 32 MCMOL/L  


 


Total Creatine Kinase 83 U/L  


 


Troponin I


  LESS THAN 0.02


NG/ML 


 


 


Thyroid Stimulating Hormone


3rd Gen 3.820 uIU/ML 


  


 


 


Lithium Level


  LESS THAN 0.1


MEQ/L 


 


 


Ethyl Alcohol Level


  LESS THAN 3


MG/DL 


 


 


Urine Color  YELLOW 


 


Urine Turbidity  CLEAR 


 


Urine pH  6.5 


 


Urine Specific Gravity  1.021 


 


Urine Protein  TRACE mg/dL 


 


Urine Glucose (UA)  1000 mg/dL 


 


Urine Ketones  NEG mg/dL 


 


Urine Occult Blood  NEG 


 


Urine Nitrite  NEG 


 


Urine Bilirubin  NEG 


 


Urine Urobilinogen


  


  LESS THAN 2.0


MG/DL


 


Urine Leukocyte Esterase  LARGE 


 


Urine RBC


  


  LESS THAN 1


/hpf


 


Urine WBC  17 /hpf 


 


Urine Squamous Epithelial


Cells 


  1 /hpf 


 


 


Urine Bacteria  OCC /hpf 


 


Urine Mucus  FEW /lpf 


 


Microscopic Urinalysis Comment


  


  CATH-CULTURE


IND








Differential Diagnosis


Differential diagnosis includes lithium toxicity, near syncope, CVA, TIA, 

intracranial hemorrhage, hypoglycemia, bipolar affective disorder, seizure with 

postictal state, hyponatremia, dehydration, arrhythmia.


Narrative Course


IV was established, labs are drawn and sent, and the patient was placed on 

cardiac telemetry monitoring and continuous pulse oximetry monitoring.  EKG was 

ordered and interpreted.  CT of the brain was obtained.  The patient was 

administered and IV fluids.  Orthostatic vital signs were obtained.  Lithium 

level was sent to lab.  Lithium level is unremarkable.  Orthostatic vital signs 

are normal.  CT the brain is negative.  He is positive, the patient received 

Cipro 4 mg intravenously.  The patient was reassessed, her blood pressure had 

improved and her symptoms have improved.  The patient be discharged home with 

Cipro.





Diagnosis





 Primary Impression:  


 Dizziness


 Additional Impression:  


 UTI (urinary tract infection)


 Qualified Codes:  N30.00 - Acute cystitis without hematuria


Patient Instructions:  General Instructions





***Additional Instructions:  


Please provide the patient a copy of her CT results and lab results at 

discharge.  Follow-up with her primary physician.  Medications as directed.  

Return if symptoms worsen or progress.  Plenty of fluids to stay hydrated.


***Med/Other Pt SpecificInfo:  Prescription(s) given


Scripts


Ciprofloxacin (Cipro) 500 Mg Tab


500 MG PO BID for Infection for 7 Days, #14 TAB 0 Refills


   Prov: Ba Anderson MD         2/10/18


Disposition:  01 DISCHARGE HOME


Condition:  Stable











Ba Anderson MD Feb 10, 2018 07:58

## 2018-02-11 NOTE — EKG
Date Performed: 02/10/2018       Time Performed: 08:01:44

 

PTAGE:      67 years

 

EKG:      SINUS BRADYCARDIA MINIMAL VOLTAGE CRITERIA FOR LVH, CONSIDER NORMAL VARIANT NONSPECIFIC T-W
AVE ABNORMALITY BORDERLINE ECG

 

PREVIOUS TRACING       : 08/12/2017 11.11 Compared to prior tracing, now with LVH criteria

 

DOCTOR:   Sagar Saenz  Interpretating Date/Time  02/11/2018 17:45:06

## 2018-02-19 ENCOUNTER — HOSPITAL ENCOUNTER (EMERGENCY)
Dept: HOSPITAL 17 - NEPC | Age: 68
Discharge: HOME | End: 2018-02-19
Payer: COMMERCIAL

## 2018-02-19 VITALS
HEART RATE: 78 BPM | OXYGEN SATURATION: 98 % | SYSTOLIC BLOOD PRESSURE: 123 MMHG | RESPIRATION RATE: 18 BRPM | DIASTOLIC BLOOD PRESSURE: 71 MMHG | TEMPERATURE: 98.4 F

## 2018-02-19 VITALS
RESPIRATION RATE: 16 BRPM | OXYGEN SATURATION: 99 % | HEART RATE: 67 BPM | DIASTOLIC BLOOD PRESSURE: 83 MMHG | SYSTOLIC BLOOD PRESSURE: 115 MMHG

## 2018-02-19 VITALS — BODY MASS INDEX: 24.34 KG/M2 | WEIGHT: 132.28 LBS | HEIGHT: 62 IN

## 2018-02-19 DIAGNOSIS — Z91.14: ICD-10-CM

## 2018-02-19 DIAGNOSIS — R53.1: ICD-10-CM

## 2018-02-19 DIAGNOSIS — R94.31: ICD-10-CM

## 2018-02-19 DIAGNOSIS — E11.9: ICD-10-CM

## 2018-02-19 DIAGNOSIS — I10: ICD-10-CM

## 2018-02-19 DIAGNOSIS — G40.909: Primary | ICD-10-CM

## 2018-02-19 DIAGNOSIS — Z79.899: ICD-10-CM

## 2018-02-19 DIAGNOSIS — Z86.73: ICD-10-CM

## 2018-02-19 DIAGNOSIS — F31.9: ICD-10-CM

## 2018-02-19 LAB
ALBUMIN SERPL-MCNC: 3.5 GM/DL (ref 3.4–5)
ALP SERPL-CCNC: 94 U/L (ref 45–117)
ALT SERPL-CCNC: 13 U/L (ref 10–53)
AST SERPL-CCNC: 22 U/L (ref 15–37)
BACTERIA #/AREA URNS HPF: (no result) /HPF
BASOPHILS # BLD AUTO: 0 TH/MM3 (ref 0–0.2)
BASOPHILS NFR BLD: 0.2 % (ref 0–2)
BILIRUB SERPL-MCNC: 0.5 MG/DL (ref 0.2–1)
BUN SERPL-MCNC: 20 MG/DL (ref 7–18)
CALCIUM SERPL-MCNC: 8.3 MG/DL (ref 8.5–10.1)
CHLORIDE SERPL-SCNC: 107 MEQ/L (ref 98–107)
COLOR UR: YELLOW
CREAT SERPL-MCNC: 1.26 MG/DL (ref 0.5–1)
EOSINOPHIL # BLD: 0.2 TH/MM3 (ref 0–0.4)
EOSINOPHIL NFR BLD: 3 % (ref 0–4)
ERYTHROCYTE [DISTWIDTH] IN BLOOD BY AUTOMATED COUNT: 12.5 % (ref 11.6–17.2)
GFR SERPLBLD BASED ON 1.73 SQ M-ARVRAT: 43 ML/MIN (ref 89–?)
GLUCOSE SERPL-MCNC: 116 MG/DL (ref 74–106)
GLUCOSE UR STRIP-MCNC: (no result) MG/DL
HCO3 BLD-SCNC: 23.5 MEQ/L (ref 21–32)
HCT VFR BLD CALC: 32.8 % (ref 35–46)
HGB BLD-MCNC: 11.2 GM/DL (ref 11.6–15.3)
HGB UR QL STRIP: (no result)
HYALINE CASTS #/AREA URNS LPF: 1 /LPF
INR PPP: 1.1 RATIO
KETONES UR STRIP-MCNC: (no result) MG/DL
LYMPHOCYTES # BLD AUTO: 2.3 TH/MM3 (ref 1–4.8)
LYMPHOCYTES NFR BLD AUTO: 38.1 % (ref 9–44)
MCH RBC QN AUTO: 30.4 PG (ref 27–34)
MCHC RBC AUTO-ENTMCNC: 34.1 % (ref 32–36)
MCV RBC AUTO: 88.9 FL (ref 80–100)
MONOCYTE #: 0.5 TH/MM3 (ref 0–0.9)
MONOCYTES NFR BLD: 8.1 % (ref 0–8)
MUCOUS THREADS #/AREA URNS LPF: (no result) /LPF
NEUTROPHILS # BLD AUTO: 3 TH/MM3 (ref 1.8–7.7)
NEUTROPHILS NFR BLD AUTO: 50.6 % (ref 16–70)
NITRITE UR QL STRIP: (no result)
PLATELET # BLD: 245 TH/MM3 (ref 150–450)
PMV BLD AUTO: 8.8 FL (ref 7–11)
PROT SERPL-MCNC: 7.8 GM/DL (ref 6.4–8.2)
PROTHROMBIN TIME: 11.2 SEC (ref 9.8–11.6)
RBC # BLD AUTO: 3.69 MIL/MM3 (ref 4–5.3)
SODIUM SERPL-SCNC: 140 MEQ/L (ref 136–145)
SP GR UR STRIP: 1.02 (ref 1–1.03)
SQUAMOUS #/AREA URNS HPF: 5 /HPF (ref 0–5)
TROPONIN I SERPL-MCNC: (no result) NG/ML (ref 0.02–0.05)
URINE LEUKOCYTE ESTERASE: (no result)
WBC # BLD AUTO: 6 TH/MM3 (ref 4–11)

## 2018-02-19 PROCEDURE — 99285 EMERGENCY DEPT VISIT HI MDM: CPT

## 2018-02-19 PROCEDURE — 70450 CT HEAD/BRAIN W/O DYE: CPT

## 2018-02-19 PROCEDURE — 84443 ASSAY THYROID STIM HORMONE: CPT

## 2018-02-19 PROCEDURE — 85610 PROTHROMBIN TIME: CPT

## 2018-02-19 PROCEDURE — 80307 DRUG TEST PRSMV CHEM ANLYZR: CPT

## 2018-02-19 PROCEDURE — 87086 URINE CULTURE/COLONY COUNT: CPT

## 2018-02-19 PROCEDURE — 84484 ASSAY OF TROPONIN QUANT: CPT

## 2018-02-19 PROCEDURE — 81001 URINALYSIS AUTO W/SCOPE: CPT

## 2018-02-19 PROCEDURE — 80053 COMPREHEN METABOLIC PANEL: CPT

## 2018-02-19 PROCEDURE — 80178 ASSAY OF LITHIUM: CPT

## 2018-02-19 PROCEDURE — 82550 ASSAY OF CK (CPK): CPT

## 2018-02-19 PROCEDURE — 93005 ELECTROCARDIOGRAM TRACING: CPT

## 2018-02-19 PROCEDURE — 82140 ASSAY OF AMMONIA: CPT

## 2018-02-19 PROCEDURE — 71045 X-RAY EXAM CHEST 1 VIEW: CPT

## 2018-02-19 PROCEDURE — 85025 COMPLETE CBC W/AUTO DIFF WBC: CPT

## 2018-02-19 NOTE — RADRPT
EXAM DATE/TIME:  02/19/2018 12:29 

 

[facility] COMPARISON:     

CT BRAIN W/O CONTRAST, February 10, 2018, 8:29.

 

 

INDICATIONS :     

Altered mental status.

                      

 

RADIATION DOSE:     

34.75 CTDIvol (mGy) 

 

 

 

MEDICAL HISTORY :     

Cerebrovascular disease. Hypertension. Seizures.Diabetes

 

SURGICAL HISTORY :      

Craniotomy. 

 

ENCOUNTER:      

Initial

 

ACUITY:      

1 day

 

PAIN SCALE:      

0/10

 

LOCATION:        

cranial 

 

TECHNIQUE:     

Multiple contiguous axial images were obtained of the head.  Using automated exposure control and adj
ustment of the mA and/or kV according to patient size, radiation dose was kept as low as reasonably a
chievable to obtain optimal diagnostic quality images.   DICOM format image data is available electro
nically for review and comparison.  

 

FINDINGS:     

 

CEREBRUM:     

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 The ventricles
 are normal for age.  No evidence of midline shift, mass lesion, hemorrhage or acute infarction.  No 
extra-axial fluid collections are seen.

 

POSTERIOR FOSSA:     

The cerebellum and brainstem are intact.  The 4th ventricle is midline.  The cerebellopontine angle i
s unremarkable.

 

EXTRACRANIAL:     

Prior left temporal craniotomy. The visualized portion of the orbits is intact.

 

SKULL:     

The calvaria is intact.  No evidence of skull fracture.

 

CONCLUSION:     

1. Prior aneurysm clipping within the suprasellar cistern.

2. No acute intracranial abnormality.

 

 

 

 Moshe Ayon Jr., MD on February 19, 2018 at 12:37           

Board Certified Radiologist.

 This report was verified electronically.

## 2018-02-19 NOTE — PD
HPI


Chief Complaint:  Altered Mental Status


Time Seen by Provider:  11:38


Travel History


International Travel<30 days:  No


Contact w/Intl Traveler<30days:  No





History of Present Illness


HPI


67-year-old male female with a history of seizure disorder, right sided weakness

, and psych history presents emergency department via [facility] for a fall that 

occurred just prior to arrival. According to [facility], she was at [facility] when 

her right leg gave out, falling to the ground.  on scene.  After 

discussion with [facility], it appears that patient has multiple episodes of 

fluctuating mental status that resulted in similar episodes. They believe that 

she may have had a seizure. Currently, patient states she has a headache.  

Patient is alert and oriented to self but believes the year is  and does 

not answer all the questions appropriately.  Patient states she does feel weak.

  Denies fever, chills, chest pain, shortness of breath.  Denies urinary 

symptoms.  She thinks she lives at home alone.  Patient history is very limited 

as patient is rather altered and selective in answering questions.





PFSH


Past Medical History


Bipolar Disorder:  Yes


Cancer:  No


Cardiovascular Problems:  Yes


Cerebrovascular Accident:  Yes


Diabetes:  Yes


Diminished Hearing:  No


Gastrointestinal Disorders:  No


Genitourinary:  No


Headaches:  Yes


Hypertension:  Yes


Musculoskeletal:  No


Neurologic:  Yes


Psychiatric:  Yes


Reproductive:  No


Respiratory:  No


Immunizations Current:  Yes (unknown)


Migraines:  Yes


Seizures:  Yes


:  7


Para:  3


Miscarriage:  4


Tubal Ligation:  Yes





Past Surgical History


Neurologic Surgery:  Yes (BACK SURGERY)


Tonsillectomy:  Yes


Other Surgery:  Yes (SPINAL SURGERY, CYST REMOVED FROM CHIN, BRAIN CLIP)





Social History


Alcohol Use:  No


Tobacco Use:  No


Substance Use:  No





Allergies-Medications


(Allergen,Severity, Reaction):  


Coded Allergies:  


     aspirin (Unverified  Allergy, Unknown, 2/10/18)


     diclofenac (Unverified  Allergy, Unknown, Rash, 2/10/18)


     etodolac (Unverified  Allergy, Unknown, Rash, 2/10/18)


     flurbiprofen (Unverified  Allergy, Unknown, Rash, 2/10/18)


     ibuprofen (Unverified  Allergy, Unknown, Rash, 2/10/18)


     indomethacin (Unverified  Allergy, Unknown, Rash, 2/10/18)


     ketoprofen (Unverified  Allergy, Unknown, Rash, 2/10/18)


     ketorolac (Unverified  Allergy, Unknown, Rash, 2/10/18)


     naproxen (Unverified  Allergy, Unknown, Rash, 2/10/18)


     oxaprozin (Unverified  Allergy, Unknown, Rash, 2/10/18)


     MRI PRECAUTION (Verified  Adverse Reaction, Severe, ANEURYSM CLIPS IN BRAIN

, 2/10/18)


 11/22/15 JLA


Reported Meds & Prescriptions





Reported Meds & Active Scripts


Active


Cipro (Ciprofloxacin HCl) 500 Mg Tab 500 Mg PO BID 7 Days


Reported


Seroquel (Quetiapine Fumarate) 50 Mg Tab 50 Mg PO AS DIRECTED


Lithium Carbonate 300 Mg Cap 300 Mg PO DAILY


Hydrocodone-Acetaminophen 5-325 mg Tab 1 Tab PO Q6H PRN


Lipitor (Atorvastatin Calcium) 20 Mg Tab 20 Mg PO HS


[Diabetes Medication]   Mg PO DAILY


Lisinopril 20 Mg Tab 20 Mg PO Q12HR


Keppra (Levetiracetam) 500 Mg Tab 500 Mg PO BID


Clonidine (Clonidine HCl) 0.1 Mg Tab 0.1 Mg PO BID








Review of Systems


Except as stated in HPI:  all other systems reviewed are Neg





Physical Exam


Narrative


GENERAL: WD, WN in NAD


SKIN: Focused skin assessment warm/dry.


HEAD: Atraumatic. Normocephalic. 


EYES: Pupils equal and round. No scleral icterus. No injection or drainage. 


ENT: No nasal bleeding or discharge.  Mucous membranes pink and moist.


NECK: Trachea midline. No JVD. 


CARDIOVASCULAR: Regular rate and rhythm.  No murmur appreciated.


RESPIRATORY: No accessory muscle use. Clear to auscultation. Breath sounds 

equal bilaterally. 


GASTROINTESTINAL: Abdomen soft, non-tender, nondistended. 


MUSCULOSKELETAL: No obvious deformities. No clubbing.  No cyanosis.  No edema. 


NEUROLOGICAL: Awake. No obvious cranial nerve deficits. Normal speech. 


PSYCHIATRIC: Appropriate mood, apparent selective answering of questions





Data


Data


Last Documented VS





Vital Signs








  Date Time  Temp Pulse Resp B/P (MAP) Pulse Ox O2 Delivery O2 Flow Rate FiO2


 


18 16:30        


 


18 13:20  67 16  99 Room Air  


 


18 11:27 98.4       








Orders





 Orders


Electrocardiogram (18 11:33)


Ammonia (18 11:33)


Complete Blood Count With Diff (18 11:33)


Comprehensive Metabolic Panel (18 11:33)


Creatine Kinase (Cpk) (18 11:33)


Prothrombin Time / Inr (Pt) (18 11:33)


Troponin I (18 11:33)


Thyroid Stimulating Hormone (18 11:33)


Urinalysis - C+S If Indicated (18 11:33)


Chest, Single Ap (18 11:33)


Ct Brain W/O Iv Contrast(Rout) (18 11:33)


Blood Glucose (18 11:33)


Drug Screen, Random Urine (18 11:33)


Alcohol (Ethanol) (18 11:33)


Lithium (Li) (18 11:38)


Urine Culture (18 13:40)


Case Management Consult (18 )


Ed Discharge Order (18 16:22)





Labs





Laboratory Tests








Test


  18


11:43 18


13:29 18


13:40


 


White Blood Count 6.0 TH/MM3   


 


Red Blood Count 3.69 MIL/MM3   


 


Hemoglobin 11.2 GM/DL   


 


Hematocrit 32.8 %   


 


Mean Corpuscular Volume 88.9 FL   


 


Mean Corpuscular Hemoglobin 30.4 PG   


 


Mean Corpuscular Hemoglobin


Concent 34.1 % 


  


  


 


 


Red Cell Distribution Width 12.5 %   


 


Platelet Count 245 TH/MM3   


 


Mean Platelet Volume 8.8 FL   


 


Neutrophils (%) (Auto) 50.6 %   


 


Lymphocytes (%) (Auto) 38.1 %   


 


Monocytes (%) (Auto) 8.1 %   


 


Eosinophils (%) (Auto) 3.0 %   


 


Basophils (%) (Auto) 0.2 %   


 


Neutrophils # (Auto) 3.0 TH/MM3   


 


Lymphocytes # (Auto) 2.3 TH/MM3   


 


Monocytes # (Auto) 0.5 TH/MM3   


 


Eosinophils # (Auto) 0.2 TH/MM3   


 


Basophils # (Auto) 0.0 TH/MM3   


 


CBC Comment DIFF FINAL   


 


Differential Comment    


 


Prothrombin Time 11.2 SEC   


 


Prothromb Time International


Ratio 1.1 RATIO 


  


  


 


 


Blood Urea Nitrogen 20 MG/DL   


 


Creatinine 1.26 MG/DL   


 


Random Glucose 116 MG/DL   


 


Total Protein 7.8 GM/DL   


 


Albumin 3.5 GM/DL   


 


Calcium Level 8.3 MG/DL   


 


Alkaline Phosphatase 94 U/L   


 


Aspartate Amino Transf


(AST/SGOT) 22 U/L 


  


  


 


 


Alanine Aminotransferase


(ALT/SGPT) 13 U/L 


  


  


 


 


Total Bilirubin 0.5 MG/DL   


 


Sodium Level 140 MEQ/L   


 


Potassium Level 3.8 MEQ/L   


 


Chloride Level 107 MEQ/L   


 


Carbon Dioxide Level 23.5 MEQ/L   


 


Anion Gap 10 MEQ/L   


 


Estimat Glomerular Filtration


Rate 43 ML/MIN 


  


  


 


 


Ammonia 24 MCMOL/L   


 


Total Creatine Kinase 105 U/L   


 


Troponin I


  LESS THAN 0.02


NG/ML 


  


 


 


Thyroid Stimulating Hormone


3rd Gen 0.874 uIU/ML 


  


  


 


 


Ethyl Alcohol Level


  LESS THAN 3


MG/DL 


  


 


 


Lithium Level


  


  LESS THAN 0.1


MEQ/L 


 


 


Urine Color   YELLOW 


 


Urine Turbidity   HAZY 


 


Urine pH   5.5 


 


Urine Specific Gravity   1.025 


 


Urine Protein   TRACE mg/dL 


 


Urine Glucose (UA)   TRACE mg/dL 


 


Urine Ketones   NEG mg/dL 


 


Urine Occult Blood   NEG 


 


Urine Nitrite   NEG 


 


Urine Bilirubin   NEG 


 


Urine Urobilinogen   2.0 MG/DL 


 


Urine Leukocyte Esterase   LARGE 


 


Urine RBC   2 /hpf 


 


Urine WBC   7 /hpf 


 


Urine Squamous Epithelial


Cells 


  


  5 /hpf 


 


 


Urine Bacteria   RARE /hpf 


 


Urine Hyaline Casts   1 /lpf 


 


Urine Mucus   FEW /lpf 


 


Microscopic Urinalysis Comment


  


  


  CATH-CULTURE


IND


 


Urine Opiates Screen   NEG 


 


Urine Barbiturates Screen   NEG 


 


Urine Amphetamines Screen   NEG 


 


Urine Benzodiazepines Screen   NEG 


 


Urine Cocaine Screen   NEG 


 


Urine Cannabinoids Screen   NEG 











MDM


Medical Decision Making


Medical Screen Exam Complete:  Yes


Emergency Medical Condition:  Yes


Differential Diagnosis


CVA, TIA, Seizure, generalized weakness


Narrative Course


67-year-old male female with a history of seizure disorder, right sided weakness

, and psych history presents emergency department via [facility] for a fall that 

occurred just prior to arrival. According to [facility], she was at [facility] when 

her right leg gave out, falling to the ground.  on scene.  After 

discussion with [facility], it appears that patient has multiple episodes of 

fluctuating mental status that resulted in similar episodes. They believe that 

she may have had a seizure. Currently, patient states she has a headache.  

Patient is alert and oriented to self but believes the year is  and does 

not answer all the questions appropriately.  Patient states she does feel weak.

  Denies fever, chills, chest pain, shortness of breath.  Denies urinary 

symptoms.  She thinks she lives at home alone.  Patient does not know history 

is very limited as patient is rather altered and selective in answering 

questions.


Vital signs stable.


Physical exam findings consistent with a well-developed well-nourished 

appearing stated age, 67-year-old female no acute distress.  Right upper and 

lower extremities grade4-5/5 compared to right which is normal for her.


I spoke with the nurses regarding her answers to my questions.  It appears that 

patient is able to answer some questions appropriately but others not.  Suspect 

that there may be a malingering component to her condition at this point.


I suspect that patient does not follow up with her primary care physician and 

does not take her medications as prescribed.  Patient may have had a seizure 

based off of this information.


Throughout the emergency department visit, patient was able to get more 

information to the nurse, Frankie.  States that she does follow psychiatry for 

her lithium prescription and levels.  States today she was angry at her friend 

Lety and she started walking fast then felt dizzy and fell to the ground.  


Patient is apparently back to her baseline after observation in the ED today. 





Laboratory Tests








Test


  18


11:43 18


13:29 18


13:40


 


White Blood Count 6.0 TH/MM3   


 


Red Blood Count 3.69 MIL/MM3   


 


Hemoglobin 11.2 GM/DL   


 


Hematocrit 32.8 %   


 


Mean Corpuscular Volume 88.9 FL   


 


Mean Corpuscular Hemoglobin 30.4 PG   


 


Mean Corpuscular Hemoglobin


Concent 34.1 % 


  


  


 


 


Red Cell Distribution Width 12.5 %   


 


Platelet Count 245 TH/MM3   


 


Mean Platelet Volume 8.8 FL   


 


Neutrophils (%) (Auto) 50.6 %   


 


Lymphocytes (%) (Auto) 38.1 %   


 


Monocytes (%) (Auto) 8.1 %   


 


Eosinophils (%) (Auto) 3.0 %   


 


Basophils (%) (Auto) 0.2 %   


 


Neutrophils # (Auto) 3.0 TH/MM3   


 


Lymphocytes # (Auto) 2.3 TH/MM3   


 


Monocytes # (Auto) 0.5 TH/MM3   


 


Eosinophils # (Auto) 0.2 TH/MM3   


 


Basophils # (Auto) 0.0 TH/MM3   


 


CBC Comment DIFF FINAL   


 


Differential Comment    


 


Prothrombin Time 11.2 SEC   


 


Prothromb Time International


Ratio 1.1 RATIO 


  


  


 


 


Blood Urea Nitrogen 20 MG/DL   


 


Creatinine 1.26 MG/DL   


 


Random Glucose 116 MG/DL   


 


Total Protein 7.8 GM/DL   


 


Albumin 3.5 GM/DL   


 


Calcium Level 8.3 MG/DL   


 


Alkaline Phosphatase 94 U/L   


 


Aspartate Amino Transf


(AST/SGOT) 22 U/L 


  


  


 


 


Alanine Aminotransferase


(ALT/SGPT) 13 U/L 


  


  


 


 


Total Bilirubin 0.5 MG/DL   


 


Sodium Level 140 MEQ/L   


 


Potassium Level 3.8 MEQ/L   


 


Chloride Level 107 MEQ/L   


 


Carbon Dioxide Level 23.5 MEQ/L   


 


Anion Gap 10 MEQ/L   


 


Estimat Glomerular Filtration


Rate 43 ML/MIN 


  


  


 


 


Ammonia 24 MCMOL/L   


 


Total Creatine Kinase 105 U/L   


 


Troponin I


  LESS THAN 0.02


NG/ML 


  


 


 


Thyroid Stimulating Hormone


3rd Gen 0.874 uIU/ML 


  


  


 


 


Ethyl Alcohol Level


  LESS THAN 3


MG/DL 


  


 


 


Lithium Level


  


  LESS THAN 0.1


MEQ/L 


 


 


Urine Color   YELLOW 


 


Urine Turbidity   HAZY 


 


Urine pH   5.5 


 


Urine Specific Gravity   1.025 


 


Urine Protein   TRACE mg/dL 


 


Urine Glucose (UA)   TRACE mg/dL 


 


Urine Ketones   NEG mg/dL 


 


Urine Occult Blood   NEG 


 


Urine Nitrite   NEG 


 


Urine Bilirubin   NEG 


 


Urine Urobilinogen   2.0 MG/DL 


 


Urine Leukocyte Esterase   LARGE 


 


Urine RBC   2 /hpf 


 


Urine WBC   7 /hpf 


 


Urine Squamous Epithelial


Cells 


  


  5 /hpf 


 


 


Urine Bacteria   RARE /hpf 


 


Urine Hyaline Casts   1 /lpf 


 


Urine Mucus   FEW /lpf 


 


Microscopic Urinalysis Comment


  


  


  CATH-CULTURE


IND


 


Urine Opiates Screen   NEG 


 


Urine Barbiturates Screen   NEG 


 


Urine Amphetamines Screen   NEG 


 


Urine Benzodiazepines Screen   NEG 


 


Urine Cocaine Screen   NEG 


 


Urine Cannabinoids Screen   NEG 








Last Impressions








Head CT 18 1133 Signed





Impressions: 





 Service Date/Time:  2018 12:29 - CONCLUSION:  1. Prior 





 aneurysm clipping within the suprasellar cistern. 2. No acute intracranial 





 abnormality.     Moshe Ayon Jr., MD 


 


Chest X-Ray 18 1133 Signed





Impressions: 





 Service Date/Time:  2018 11:43 - CONCLUSION: The lungs 

are 





 clear.     Moshe Benjamin MD 








I discussed my concerns with this patient today.  Says these episodes occur 

frequently. I explained to her that this was dangerous and could be deadly. She 

says that she does live alone and she is unable to read her pill bottles as she 

is illiterate which is the reason why she has not taken her medications.


Case management consulted.


Advised that we perform a face-to-face evaluation,  eval for the 

home.


Pt will be discharged. I was assured that  would be evaluating 

her tomorrow. I do not believe that she is an immediate danger to herself but 

could be if she does not receive assistance at home. I advised her to return 

for worsening or persistent symptoms.





Diagnosis





 Primary Impression:  


 Non compliance w medication regimen


 Additional Impressions:  


 Generalized weakness


 Right sided weakness


Referrals:  


Neurologist





***Additional Instructions:  


Follow up with your primary care physician within 2-3 days.


Follow up with a neurologist.


Disposition:  01 DISCHARGE HOME


Condition:  Stable











Yuliya Ac 2018 11:37

## 2018-02-19 NOTE — EKG
Date Performed: 02/19/2018       Time Performed: 11:34:52

 

PTAGE:      67 years

 

EKG:      Sinus rhythm 

 

 NONSPECIFIC T-WAVE ABNORMALITY BORDERLINE ECG

 

PREVIOUS TRACING       : 02/10/2018 08.01

 

DOCTOR:   Jerald Daugherty  Interpretating Date/Time  02/19/2018 22:42:51

## 2018-02-19 NOTE — RADRPT
EXAM DATE/TIME:  02/19/2018 11:43 

 

[facility] COMPARISON:     

CHEST SINGLE AP, September 02, 2015, 15:25.

 

                     

INDICATIONS :     

Syncope.

                     

 

MEDICAL HISTORY :     

Hypertension.       seizures   

 

SURGICAL HISTORY :        

aneurysm clip

 

ENCOUNTER:     

Initial                                        

 

ACUITY:     

1 day      

 

PAIN SCORE:     

Non-responsive.

 

LOCATION:     

Bilateral chest 

 

FINDINGS:     

A single view of the chest demonstrates the lungs to be symmetrically aerated without evidence of mas
s, infiltrate or effusion.  No evidence of pneumothorax.  The cardiomediastinal contours are unremark
able.  Osseous structures are intact.

 

CONCLUSION:     The lungs are clear.

 

 

 

 Moshe Benjamin MD on February 19, 2018 at 11:59           

Board Certified Radiologist.

 This report was verified electronically.

## 2018-03-29 ENCOUNTER — HOSPITAL ENCOUNTER (EMERGENCY)
Dept: HOSPITAL 17 - NEDAMB | Age: 68
Discharge: HOME | End: 2018-03-29
Payer: COMMERCIAL

## 2018-03-29 VITALS
OXYGEN SATURATION: 99 % | HEART RATE: 80 BPM | DIASTOLIC BLOOD PRESSURE: 83 MMHG | SYSTOLIC BLOOD PRESSURE: 165 MMHG | TEMPERATURE: 98.4 F | RESPIRATION RATE: 16 BRPM

## 2018-03-29 VITALS — WEIGHT: 154.32 LBS | HEIGHT: 64 IN | BODY MASS INDEX: 26.35 KG/M2

## 2018-03-29 DIAGNOSIS — Z79.899: ICD-10-CM

## 2018-03-29 DIAGNOSIS — I10: ICD-10-CM

## 2018-03-29 DIAGNOSIS — R56.9: ICD-10-CM

## 2018-03-29 DIAGNOSIS — Z88.6: ICD-10-CM

## 2018-03-29 DIAGNOSIS — R51: Primary | ICD-10-CM

## 2018-03-29 DIAGNOSIS — Z86.73: ICD-10-CM

## 2018-03-29 DIAGNOSIS — Z88.8: ICD-10-CM

## 2018-03-29 DIAGNOSIS — E11.9: ICD-10-CM

## 2018-03-29 DIAGNOSIS — F31.9: ICD-10-CM

## 2018-03-29 PROCEDURE — 99284 EMERGENCY DEPT VISIT MOD MDM: CPT

## 2018-03-29 PROCEDURE — 96374 THER/PROPH/DIAG INJ IV PUSH: CPT

## 2018-03-29 PROCEDURE — 96375 TX/PRO/DX INJ NEW DRUG ADDON: CPT

## 2018-03-29 NOTE — PD
HPI


.


Headache


Chief Complaint:  Headache


Time Seen by Provider:  10:49


Travel History


International Travel<30 days:  No


Contact w/Intl Traveler<30days:  No


Traveled to known affect area:  No





History of Present Illness


HPI


This patient presents with a chief complaint of an acute headache.  Onset 

today.  It is global.  Rated 9/10.  No modifying factors.





PFSH


Past Medical History


Bipolar Disorder:  Yes


Cancer:  No


Cardiovascular Problems:  Yes


Cerebrovascular Accident:  Yes


Diabetes:  Yes


Diminished Hearing:  No


Gastrointestinal Disorders:  No


Genitourinary:  No


Headaches:  Yes


Hypertension:  Yes


Musculoskeletal:  No


Neurologic:  Yes


Psychiatric:  Yes


Reproductive:  No


Respiratory:  No


Immunizations Current:  Yes (unknown)


Migraines:  Yes


Seizures:  Yes


Pregnant?:  Not Pregnant


:  7


Para:  3


Miscarriage:  4


Tubal Ligation:  Yes





Past Surgical History


Neurologic Surgery:  Yes (BACK SURGERY; ANEURSYM REPAIR)


Tonsillectomy:  Yes


Other Surgery:  Yes (SPINAL SURGERY, CYST REMOVED FROM CHIN, BRAIN CLIPS)





Social History


Alcohol Use:  No


Tobacco Use:  No


Substance Use:  No





Allergies-Medications


(Allergen,Severity, Reaction):  


Coded Allergies:  


     aspirin (Verified  Allergy, Unknown, 3/29/18)


     diclofenac (Verified  Allergy, Unknown, Rash, 3/29/18)


     etodolac (Verified  Allergy, Unknown, Rash, 3/29/18)


     flurbiprofen (Verified  Allergy, Unknown, Rash, 3/29/18)


     ibuprofen (Verified  Allergy, Unknown, Rash, 3/29/18)


     indomethacin (Verified  Allergy, Unknown, Rash, 3/29/18)


     ketoprofen (Verified  Allergy, Unknown, Rash, 3/29/18)


     ketorolac (Verified  Allergy, Unknown, Rash, 3/29/18)


     naproxen (Verified  Allergy, Unknown, Rash, 3/29/18)


     oxaprozin (Verified  Allergy, Unknown, Rash, 3/29/18)


     MRI PRECAUTION (Verified  Adverse Reaction, Severe, ANEURYSM CLIPS IN BRAIN

, 3/29/18)


 11/22/15 JLA


Reported Meds & Prescriptions





Reported Meds & Active Scripts


Active


Cipro (Ciprofloxacin HCl) 500 Mg Tab 500 Mg PO BID 7 Days


Reported


Seroquel (Quetiapine Fumarate) 50 Mg Tab 50 Mg PO AS DIRECTED


Lithium Carbonate 300 Mg Cap 300 Mg PO DAILY


Hydrocodone-Acetaminophen 5-325 mg Tab 1 Tab PO Q6H PRN


Lipitor (Atorvastatin Calcium) 20 Mg Tab 20 Mg PO HS


[Diabetes Medication]   Mg PO DAILY


Lisinopril 20 Mg Tab 20 Mg PO Q12HR


Keppra (Levetiracetam) 500 Mg Tab 500 Mg PO BID


Clonidine (Clonidine HCl) 0.1 Mg Tab 0.1 Mg PO BID








Review of Systems


Except as stated in HPI:  all other systems reviewed are Neg





Physical Exam


Narrative


GENERAL: Awake and alert and in no acute distress.


SKIN: Warm and dry.


HEAD: Normocephalic/atraumatic.  Positive scalp tenderness.


EYES: Pupils are equal.  Extraocular movements are intact.


NECK: Normal range of motion.


CARDIOVASCULAR: Regular rate and rhythm.


RESPIRATORY: Nonlabored respirations.


MUSCULOSKELETAL: Atraumatic.


NEUROLOGICAL: A and O 3.   strengths are full and equal.  Finger-nose-

finger exam is intact.  Speech is normal.


PSYCHIATRIC: Appropriate mood and affect.





Data


Data


Last Documented VS





Vital Signs








  Date Time  Temp Pulse Resp B/P (MAP) Pulse Ox O2 Delivery O2 Flow Rate FiO2


 


3/29/18 10:40      Room Air  


 


3/29/18 10:40 98.4 80 16 165/83 (110) 99   








Orders





 Orders


^ Saline Lock (3/29/18 10:49)


Diphenhydramine Inj (Benadryl Inj) (3/29/18 11:00)


Prochlorperazine Inj (Compazine Inj) (3/29/18 11:00)


Ed Discharge Order (3/29/18 12:20)








MDM


Medical Decision Making


Medical Screen Exam Complete:  Yes


Emergency Medical Condition:  Yes


Differential Diagnosis


Differential diagnosis of headache includes but is not limited to migraine, 

muscle contraction headache, brain tumor, brain bleed


Narrative Course


This patient presents for a headache.  Her headache has been treated with 

Compazine and Benadryl with good relief.  She has no focal neurological 

findings and no evidence of meningitis such as fever or stiff neck.  She will 

be discharged home.





Diagnosis





 Primary Impression:  


 Headache


 Qualified Codes:  G44.209 - Tension-type headache, unspecified, not intractable


Patient Instructions:  General Instructions


Departure Forms:  Tests/Procedures


Disposition:  01 DISCHARGE HOME


Condition:  Stable











Tangela Bowden MD Mar 29, 2018 12:51

## 2018-05-22 ENCOUNTER — HOSPITAL ENCOUNTER (EMERGENCY)
Dept: HOSPITAL 17 - NEPE | Age: 68
Discharge: HOME | End: 2018-05-22
Payer: COMMERCIAL

## 2018-05-22 VITALS
OXYGEN SATURATION: 99 % | SYSTOLIC BLOOD PRESSURE: 186 MMHG | DIASTOLIC BLOOD PRESSURE: 90 MMHG | HEART RATE: 73 BPM | RESPIRATION RATE: 17 BRPM

## 2018-05-22 VITALS
DIASTOLIC BLOOD PRESSURE: 91 MMHG | SYSTOLIC BLOOD PRESSURE: 201 MMHG | RESPIRATION RATE: 22 BRPM | OXYGEN SATURATION: 100 % | HEART RATE: 67 BPM

## 2018-05-22 VITALS
RESPIRATION RATE: 22 BRPM | TEMPERATURE: 98.1 F | DIASTOLIC BLOOD PRESSURE: 96 MMHG | HEART RATE: 85 BPM | SYSTOLIC BLOOD PRESSURE: 174 MMHG

## 2018-05-22 DIAGNOSIS — N30.00: ICD-10-CM

## 2018-05-22 DIAGNOSIS — Z79.84: ICD-10-CM

## 2018-05-22 DIAGNOSIS — R56.9: Primary | ICD-10-CM

## 2018-05-22 DIAGNOSIS — E11.9: ICD-10-CM

## 2018-05-22 DIAGNOSIS — I10: ICD-10-CM

## 2018-05-22 DIAGNOSIS — F31.9: ICD-10-CM

## 2018-05-22 DIAGNOSIS — R94.31: ICD-10-CM

## 2018-05-22 DIAGNOSIS — Z79.899: ICD-10-CM

## 2018-05-22 LAB
ALBUMIN SERPL-MCNC: 4.1 GM/DL (ref 3.4–5)
ALP SERPL-CCNC: 128 U/L (ref 45–117)
ALT SERPL-CCNC: 22 U/L (ref 10–53)
AST SERPL-CCNC: 22 U/L (ref 15–37)
BACTERIA #/AREA URNS HPF: (no result) /HPF
BASOPHILS # BLD AUTO: 0.1 TH/MM3 (ref 0–0.2)
BASOPHILS NFR BLD: 1.9 % (ref 0–2)
BILIRUB SERPL-MCNC: 0.5 MG/DL (ref 0.2–1)
BUN SERPL-MCNC: 17 MG/DL (ref 7–18)
CALCIUM SERPL-MCNC: 8.8 MG/DL (ref 8.5–10.1)
CHLORIDE SERPL-SCNC: 104 MEQ/L (ref 98–107)
COLOR UR: YELLOW
CREAT SERPL-MCNC: 1.17 MG/DL (ref 0.5–1)
EOSINOPHIL # BLD: 0.3 TH/MM3 (ref 0–0.4)
EOSINOPHIL NFR BLD: 4.6 % (ref 0–4)
ERYTHROCYTE [DISTWIDTH] IN BLOOD BY AUTOMATED COUNT: 13.2 % (ref 11.6–17.2)
GFR SERPLBLD BASED ON 1.73 SQ M-ARVRAT: 46 ML/MIN (ref 89–?)
GLUCOSE SERPL-MCNC: 151 MG/DL (ref 74–106)
GLUCOSE UR STRIP-MCNC: 100 MG/DL
HCO3 BLD-SCNC: 28 MEQ/L (ref 21–32)
HCT VFR BLD CALC: 34.6 % (ref 35–46)
HGB BLD-MCNC: 11.8 GM/DL (ref 11.6–15.3)
HGB UR QL STRIP: (no result)
INR PPP: 1 RATIO
KETONES UR STRIP-MCNC: (no result) MG/DL
LYMPHOCYTES # BLD AUTO: 2.8 TH/MM3 (ref 1–4.8)
LYMPHOCYTES NFR BLD AUTO: 42.6 % (ref 9–44)
MCH RBC QN AUTO: 30 PG (ref 27–34)
MCHC RBC AUTO-ENTMCNC: 34 % (ref 32–36)
MCV RBC AUTO: 88 FL (ref 80–100)
MONOCYTE #: 0.4 TH/MM3 (ref 0–0.9)
MONOCYTES NFR BLD: 6.5 % (ref 0–8)
NEUTROPHILS # BLD AUTO: 2.9 TH/MM3 (ref 1.8–7.7)
NEUTROPHILS NFR BLD AUTO: 44.4 % (ref 16–70)
NITRITE UR QL STRIP: (no result)
PLATELET # BLD: 278 TH/MM3 (ref 150–450)
PMV BLD AUTO: 8.8 FL (ref 7–11)
PROT SERPL-MCNC: 8.5 GM/DL (ref 6.4–8.2)
PROTHROMBIN TIME: 10.4 SEC (ref 9.8–11.6)
RBC # BLD AUTO: 3.93 MIL/MM3 (ref 4–5.3)
SODIUM SERPL-SCNC: 140 MEQ/L (ref 136–145)
SP GR UR STRIP: 1.01 (ref 1–1.03)
SQUAMOUS #/AREA URNS HPF: 1 /HPF (ref 0–5)
TRANS CELLS #/AREA URNS HPF: <1 /HPF
TROPONIN I SERPL-MCNC: (no result) NG/ML (ref 0.02–0.05)
URINE LEUKOCYTE ESTERASE: (no result)
WBC # BLD AUTO: 6.6 TH/MM3 (ref 4–11)

## 2018-05-22 PROCEDURE — 80178 ASSAY OF LITHIUM: CPT

## 2018-05-22 PROCEDURE — 80164 ASSAY DIPROPYLACETIC ACD TOT: CPT

## 2018-05-22 PROCEDURE — 87086 URINE CULTURE/COLONY COUNT: CPT

## 2018-05-22 PROCEDURE — 83690 ASSAY OF LIPASE: CPT

## 2018-05-22 PROCEDURE — 99285 EMERGENCY DEPT VISIT HI MDM: CPT

## 2018-05-22 PROCEDURE — 81001 URINALYSIS AUTO W/SCOPE: CPT

## 2018-05-22 PROCEDURE — 85730 THROMBOPLASTIN TIME PARTIAL: CPT

## 2018-05-22 PROCEDURE — 82140 ASSAY OF AMMONIA: CPT

## 2018-05-22 PROCEDURE — 85610 PROTHROMBIN TIME: CPT

## 2018-05-22 PROCEDURE — 82550 ASSAY OF CK (CPK): CPT

## 2018-05-22 PROCEDURE — 80053 COMPREHEN METABOLIC PANEL: CPT

## 2018-05-22 PROCEDURE — 85025 COMPLETE CBC W/AUTO DIFF WBC: CPT

## 2018-05-22 PROCEDURE — 80307 DRUG TEST PRSMV CHEM ANLYZR: CPT

## 2018-05-22 PROCEDURE — 70450 CT HEAD/BRAIN W/O DYE: CPT

## 2018-05-22 PROCEDURE — 96361 HYDRATE IV INFUSION ADD-ON: CPT

## 2018-05-22 PROCEDURE — 96365 THER/PROPH/DIAG IV INF INIT: CPT

## 2018-05-22 PROCEDURE — 93005 ELECTROCARDIOGRAM TRACING: CPT

## 2018-05-22 PROCEDURE — 84484 ASSAY OF TROPONIN QUANT: CPT

## 2018-05-22 PROCEDURE — 71045 X-RAY EXAM CHEST 1 VIEW: CPT

## 2018-05-22 NOTE — PD
HPI


Chief Complaint:  Altered Mental Status


Time Seen by Provider:  10:39


Travel History


International Travel<30 days:  No


Contact w/Intl Traveler<30days:  No


Traveled to known affect area:  No





History of Present Illness


HPI


Patient is a 67-year-old female with history of seizures, presents to the 

emergency room for evaluation of altered mental status.  As per EMS, patient 

was at a community center today and was sitting down for an activity.  Reports 

that she was found slumped over in a chair.  Reports that patient was acting 

like her normal self, reports that she appears more confused at this time.  

Bystanders were unsure if patient had a seizure.  Patient reports that at this 

time, she feels dizzy, reports that in addition to dizziness, she has been 

feeling nauseous, reports that she did vomit last night.  Patient with no 

abdominal pain at this time.  Patient denies any vision changes, denies 

headache.  Patient denies any falls, denies any chest pain or shortness of 

breath.  Patient is alert only to person, she thinks that it is 1995 and is 

unsure who the president is.  Blood sugar was 198 by EMS.  Patient with no 

incontinence of urine





PFSH


Past Medical History


Bipolar Disorder:  Yes


Cancer:  No


Cardiovascular Problems:  Yes


Cerebrovascular Accident:  Yes


Diabetes:  Yes


Patient Takes Glucophage:  Yes


Diminished Hearing:  No


Gastrointestinal Disorders:  No


Genitourinary:  No


Headaches:  Yes


Hypertension:  Yes


Musculoskeletal:  No


Neurologic:  Yes


Psychiatric:  Yes


Reproductive:  No


Respiratory:  No


Immunizations Current:  Yes (unknown)


Migraines:  Yes


Seizures:  Yes


Pregnant?:  Not Pregnant


:  7


Para:  3


Miscarriage:  4


Tubal Ligation:  Yes





Past Surgical History


Neurologic Surgery:  Yes (BACK SURGERY; ANEURSYM REPAIR)


Tonsillectomy:  Yes


Other Surgery:  Yes (SPINAL SURGERY, CYST REMOVED FROM CHIN, BRAIN CLIPS)





Social History


Alcohol Use:  No


Tobacco Use:  No


Substance Use:  No





Allergies-Medications


(Allergen,Severity, Reaction):  


Coded Allergies:  


     aspirin (Verified  Allergy, Unknown, 18)


     diclofenac (Verified  Allergy, Unknown, Rash, 18)


     etodolac (Verified  Allergy, Unknown, Rash, 18)


     flurbiprofen (Verified  Allergy, Unknown, Rash, 18)


     ibuprofen (Verified  Allergy, Unknown, Rash, 18)


     indomethacin (Verified  Allergy, Unknown, Rash, 18)


     ketoprofen (Verified  Allergy, Unknown, Rash, 18)


     ketorolac (Verified  Allergy, Unknown, Rash, 18)


     naproxen (Verified  Allergy, Unknown, Rash, 18)


     oxaprozin (Verified  Allergy, Unknown, Rash, 18)


     MRI PRECAUTION (Verified  Adverse Reaction, Severe, ANEURYSM CLIPS IN BRAIN

, 18)


 11/22/15 JLA


Reported Meds & Prescriptions





Reported Meds & Active Scripts


Active


Macrobid (Nitrofurantoin Monoh/Nitrofur Macro) 100 Mg Cap 100 Mg PO BID 10 Days


Cipro (Ciprofloxacin HCl) 500 Mg Tab 500 Mg PO BID 7 Days


Reported


Seroquel (Quetiapine Fumarate) 50 Mg Tab 50 Mg PO AS DIRECTED


Lithium Carbonate 300 Mg Cap 300 Mg PO DAILY


Hydrocodone-Acetaminophen 5-325 mg Tab 1 Tab PO Q6H PRN


Lipitor (Atorvastatin Calcium) 20 Mg Tab 20 Mg PO HS


[Diabetes Medication]   Mg PO DAILY


Lisinopril 20 Mg Tab 20 Mg PO Q12HR


Keppra (Levetiracetam) 500 Mg Tab 500 Mg PO BID


Clonidine (Clonidine HCl) 0.1 Mg Tab 0.1 Mg PO BID








Review of Systems


ROS Limitations:  Altered Mental Status


General / Constitutional:  No: Fever


Eyes:  No: Visual changes


HENT:  No: Headaches


Cardiovascular:  No: Chest Pain or Discomfort


Respiratory:  No: Shortness of Breath


Gastrointestinal:  No: Abdominal Pain


Genitourinary:  No: Dysuria


Musculoskeletal:  No: Pain


Skin:  No Rash


Neurologic:  Positive: Dizziness, No: Weakness


Psychiatric:  No: Depression


Endocrine:  No: Polydipsia


Hematologic/Lymphatic:  No: Easy Bruising





Physical Exam


Narrative


GENERAL: Moderate distress, patient confused, alert only to person


SKIN: Focused skin assessment warm/dry.


HEAD: Atraumatic. Normocephalic. 


EYES: Pupils equal and round. No scleral icterus. No injection or drainage. 


ENT: No nasal bleeding or discharge.  Mucous membranes pink and moist.


NECK: Trachea midline. No JVD. 


CARDIOVASCULAR: Regular rate and rhythm.  No murmur appreciated.


RESPIRATORY: No accessory muscle use. Clear to auscultation. Breath sounds 

equal bilaterally. 


GASTROINTESTINAL: Abdomen soft, non-tender, nondistended. Hepatic and splenic 

margins not palpable. 


MUSCULOSKELETAL: No obvious deformities. No clubbing.  No cyanosis.  No edema. 


NEUROLOGICAL: Awake and alert. No obvious cranial nerve deficits.  Motor 

grossly within normal limits. Normal speech.


PSYCHIATRIC: Flat mood and affect





Data


Data


Last Documented VS





Vital Signs








  Date Time  Temp Pulse Resp B/P (MAP) Pulse Ox O2 Delivery O2 Flow Rate FiO2


 


18 12:13  73 17 186/90 (122) 99 Room Air  


 


18 10:32 98.1       








Orders





 Orders


Electrocardiogram (18 10:39)


Ammonia (18 10:39)


Complete Blood Count With Diff (18 10:39)


Comprehensive Metabolic Panel (18 10:39)


Creatine Kinase (Cpk) (18 10:39)


Prothrombin Time / Inr (Pt) (18 10:39)


Act Partial Throm Time (Ptt) (18 10:39)


Troponin I (18 10:39)


Urinalysis - C+S If Indicated (18 10:39)


Chest, Single Ap (18 10:39)


Ct Brain W/O Iv Contrast(Rout) (18 10:39)


Blood Glucose (18 10:39)


Ecg Monitoring (18 10:39)


Iv Access Insert/Monitor (18 10:39)


Oximetry (18 10:39)


Sodium Chloride 0.9% Flush (Ns Flush) (18 10:45)


Sodium Chlor 0.9% 1000 Ml Inj (Ns 1000 M (18 10:39)


Drug Screen, Random Urine (18 10:39)


Lipase (18 10:39)


Valproic Acid (Depakene) (18 10:40)


Lithium (Li) (18 10:40)


Urine Culture (18 11:28)


Ceftriaxone Inj (Rocephin Inj) (18 13:45)





Labs





Laboratory Tests








Test


  18


10:47 18


10:49 18


10:56 18


11:15


 


Prothrombin Time 10.4 SEC    


 


Prothromb Time International


Ratio 1.0 RATIO 


  


  


  


 


 


Activated Partial


Thromboplast Time 27.5 SEC 


  


  


  


 


 


Blood Urea Nitrogen 17 MG/DL    


 


Creatinine 1.17 MG/DL    


 


Random Glucose 151 MG/DL    


 


Total Protein 8.5 GM/DL    


 


Albumin 4.1 GM/DL    


 


Calcium Level 8.8 MG/DL    


 


Alkaline Phosphatase 128 U/L    


 


Aspartate Amino Transf


(AST/SGOT) 22 U/L 


  


  


  


 


 


Alanine Aminotransferase


(ALT/SGPT) 22 U/L 


  


  


  


 


 


Total Bilirubin 0.5 MG/DL    


 


Sodium Level 140 MEQ/L    


 


Potassium Level 4.0 MEQ/L    


 


Chloride Level 104 MEQ/L    


 


Carbon Dioxide Level 28.0 MEQ/L    


 


Anion Gap 8 MEQ/L    


 


Estimat Glomerular Filtration


Rate 46 ML/MIN 


  


  


  


 


 


Total Creatine Kinase 60 U/L    


 


Troponin I


  LESS THAN 0.02


NG/ML 


  


  


 


 


Lipase 221 U/L    


 


White Blood Count  6.6 TH/MM3   


 


Red Blood Count  3.93 MIL/MM3   


 


Hemoglobin  11.8 GM/DL   


 


Hematocrit  34.6 %   


 


Mean Corpuscular Volume  88.0 FL   


 


Mean Corpuscular Hemoglobin  30.0 PG   


 


Mean Corpuscular Hemoglobin


Concent 


  34.0 % 


  


  


 


 


Red Cell Distribution Width  13.2 %   


 


Platelet Count  278 TH/MM3   


 


Mean Platelet Volume  8.8 FL   


 


Neutrophils (%) (Auto)  44.4 %   


 


Lymphocytes (%) (Auto)  42.6 %   


 


Monocytes (%) (Auto)  6.5 %   


 


Eosinophils (%) (Auto)  4.6 %   


 


Basophils (%) (Auto)  1.9 %   


 


Neutrophils # (Auto)  2.9 TH/MM3   


 


Lymphocytes # (Auto)  2.8 TH/MM3   


 


Monocytes # (Auto)  0.4 TH/MM3   


 


Eosinophils # (Auto)  0.3 TH/MM3   


 


Basophils # (Auto)  0.1 TH/MM3   


 


CBC Comment  DIFF FINAL   


 


Differential Comment     


 


Ammonia   15 MCMOL/L  


 


Lithium Level    0.5 MEQ/L 


 


Test


  18


11:28 


  


  


 


 


Urine Color YELLOW    


 


Urine Turbidity CLEAR    


 


Urine pH 7.0    


 


Urine Specific Gravity 1.009    


 


Urine Protein NEG mg/dL    


 


Urine Glucose (UA) 100 mg/dL    


 


Urine Ketones NEG mg/dL    


 


Urine Occult Blood TRACE    


 


Urine Nitrite NEG    


 


Urine Bilirubin NEG    


 


Urine Urobilinogen 0.2 MG/DL    


 


Urine Leukocyte Esterase LARGE    


 


Urine RBC


  LESS THAN 1


/hpf 


  


  


 


 


Urine WBC 5 /hpf    


 


Urine Squamous Epithelial


Cells 1 /hpf 


  


  


  


 


 


Urine Transitional Epithelial


Cells <1 /hpf 


  


  


  


 


 


Urine Bacteria RARE /hpf    


 


Microscopic Urinalysis Comment


  CATH-CULTURE


IND 


  


  


 


 


Urine Opiates Screen NEG    


 


Urine Barbiturates Screen NEG    


 


Urine Amphetamines Screen NEG    


 


Urine Benzodiazepines Screen NEG    


 


Urine Cocaine Screen NEG    


 


Urine Cannabinoids Screen NEG    











MDM


Medical Decision Making


Medical Screen Exam Complete:  Yes


Emergency Medical Condition:  Yes


Medical Record Reviewed:  Yes


Interpretation(s)





Vital Signs








  Date Time  Temp Pulse Resp B/P (MAP) Pulse Ox O2 Delivery O2 Flow Rate FiO2


 


18 10:32 98.1 85 22 174/96 (122)    








Differential Diagnosis


Seizure episode, CVA, intracranial hemorrhage, ACS, electrolyte abnormality, 

bipolar disorder


Narrative Course


Patient is a 67-year-old female who presents the emergency room for evaluation 

of altered mental status.  Patient was at a [facility] center today, patient had 

an episode where she became unresponsive and was slumped over.  Patient is 

confused at this time, alert only to person, she does have history of seizures, 

unsure if patient had a seizure today.  Patient with no neurological deficit 

this time, patient will require workup for possible syncope episode versus 

seizure episode..





During the course of the patients emergency department visit, the patients 

history, examination, and differential diagnosis were reviewed with the 

patient. The patient was placed on a cardiac monitor with oximetry and frequent 

blood pressure monitoring. The patient had an IV access obtained and blood work 

sent for analysis. 








The patients laboratory studies were reviewed and remarkable for 





CBC & BMP Diagram


18 10:47








Total Protein 8.5 H, Albumin 4.1, Calcium Level 8.8, Alkaline Phosphatase 128 H

, Aspartate Amino Transf (AST/SGOT) 22, Alanine Aminotransferase (ALT/SGPT) 22, 

Total Bilirubin 0.5





18 10:49














Radiology studies were reviewed and remarkable for








Last Impressions








Head CT 18 1039 Signed





Impressions: 





 CONCLUSION:


 


Chest X-Ray 18 1039 Signed





Impressions: 





 CONCLUSION: No acute pulmonary infiltrates. Stable exam.





Chest xray: no acute pulmonary infiltrates, 





UA positive for large leuk esterase, 100 glucose, 5 white blood cells, rare 

bacteria, patient was given 1 g Rocephin, she will discharged home with a 

prescription for Macrobid.





Case management reviewed patient's case for dispo planning - patient doesn't 

meet any requirements for admission to hospital or for assisted living- she 

will need to be ultimately discharged to home.   Patient alert and oriented and 

back to her baseline mental status at this time.





Diagnosis





 Primary Impression:  


 Seizure


 Additional Impressions:  


 UTI (urinary tract infection)


 Qualified Codes:  N30.00 - Acute cystitis without hematuria


 Generalized weakness


Patient Instructions:  General Instructions





***Additional Instructions:  


**Please provide patient with a copy of their lab work and studies at discharge*

*





Please follow up with your primary care doctor in 2-3 days





Return to the ER if symptoms worsen or progress





Return to the ER as needed





Please take all antibiotics as prescribed


***Med/Other Pt SpecificInfo:  Prescription(s) given


Scripts


Nitrofurantoin Monohydrate Macrocrystals (Macrobid) 100 Mg Cap


100 MG PO BID for Infection for 10 Days, #20 CAP 0 Refills


   Prov: Vianey Kaplan DO         18


Disposition:  01 DISCHARGE HOME


Condition:  Stable











Vianey Kaplan DO May 22, 2018 10:46

## 2018-05-22 NOTE — RADRPT
EXAM DATE:  5/22/2018 12:00 PM EDT

AGE/SEX:        67 years / Female



INDICATIONS:  Altered mental status



CLINICAL DATA:  This is the patient's initial encounter. Patient reports that signs and symptoms have
 been present for 1 day and indicates a pain score of 0/10. 

                                                                          

MEDICAL/SURGICAL HISTORY:   Cerebrovascular disease.  Hypertension.  Diabetes.  seizures  Tubal ligat
ion.



RADIATION DOSE:  34.90 CTDI (mGy)







[place]      [facility] [place].  .





TECHNIQUE:  CT of the head without contrast.  Using automated exposure control and adjustment of the 
mA and/or kV according to patient size, radiation dose was kept as low as reasonably achievable to ob
tain optimal diagnostic quality images.



FINDINGS:  There is prior aneurysm clipping in the left suprasellar region. Previous left craniotomy.
 No acute intracranial hemorrhage, mass effect or midline shift. No hydrocephalus. No abnormal extra-
axial fluid or hemorrhage.



CONCLUSION:

1.  No acute findings. Previous aneurysm clipping on the left.



Electronically signed by: Modesto Oliver MD  5/22/2018 12:03 PM EDT

## 2018-05-22 NOTE — HHI.FF
Face to Face Verification


Diagnosis:  


(1) Seizure


(2) Generalized weakness





Order: To Evaluate:  Living conditions/environment, Support services


Order: To Provide:  Long range planning, Community services











I have seen patient Mulu Chua on 5/22/18. My clinical findings 

support the need for the requested home health care services because:








 Limited ability to care for self














I certify that my clinical findings support that this patient is homebound 

because:








 Need for psychosocial assistance

















Vianey Kaplan DO May 22, 2018 13:53

## 2018-05-22 NOTE — RADRPT
EXAM DATE:  5/22/2018 11:06 AM EDT

AGE/SEX:        67 years / Female



INDICATIONS:  Shortness of breath.



CLINICAL DATA:  This is the patient's initial encounter. Patient reports that signs and symptoms have
 been present for 1 day and indicates a pain score of 0/10. 

                                                                          

MEDICAL/SURGICAL HISTORY:       Diabetes mellitus type II.  Hypertension. None.



[place]      [facility] [place]/19/[place].  .



FINDINGS:  A single AP view of the chest demonstrates the lungs to be symmetrically aerated without e
vidence of mass, infiltrate or effusion.  The cardiomediastinal contours are unremarkable.  Osseous s
tructures are intact.  





CONCLUSION: No acute pulmonary infiltrates. Stable exam.



Electronically signed by: Nima Suggs MD  5/22/2018 11:08 AM EDT

## 2018-05-22 NOTE — EKG
Date Performed: 05/22/2018       Time Performed: 11:14:14

 

PTAGE:      67 years

 

EKG:      Sinus rhythm 

 

 NONSPECIFIC T-WAVE ABNORMALITY BORDERLINE ECG No significant change from prior electrocardiogram. 

 

 PREVIOUS TRACING            : 02/19/2018 11.34

 

DOCTOR:   Hamlet Grace  Interpretating Date/Time  05/22/2018 14:03:33

## 2018-05-22 NOTE — HHI.FF
Face to Face Verification


Diagnosis:  


(1) Seizure


(2) Generalized weakness


Physical Therapy


Order:  Evaluate and Treat





Occupational Therapy


Order:  Evaluate and Treat, Improve ADL





Home Health Nursing








Order: Medical education








Instructions:


SKILLED NURSING and THERAPY








Order: To Evaluate:  Living conditions/environment, Support services


Order: To Provide:  Long range planning, Community services











I have seen patient Mulu Chua on 5/22/18. My clinical findings 

support the need for the requested home health care services because:








 Deconditioned w/ increased weakness














I certify that my clinical findings support that this patient is homebound 

because:








 Need for psychosocial assistance

















Vianey Kaplan DO May 22, 2018 16:48